# Patient Record
Sex: FEMALE | Race: WHITE | NOT HISPANIC OR LATINO | Employment: OTHER | URBAN - METROPOLITAN AREA
[De-identification: names, ages, dates, MRNs, and addresses within clinical notes are randomized per-mention and may not be internally consistent; named-entity substitution may affect disease eponyms.]

---

## 2017-07-08 ENCOUNTER — OFFICE VISIT (OUTPATIENT)
Dept: URGENT CARE | Facility: URGENT CARE | Age: 77
End: 2017-07-08
Payer: COMMERCIAL

## 2017-07-08 VITALS
HEART RATE: 84 BPM | SYSTOLIC BLOOD PRESSURE: 174 MMHG | DIASTOLIC BLOOD PRESSURE: 90 MMHG | WEIGHT: 166.6 LBS | OXYGEN SATURATION: 97 % | TEMPERATURE: 98 F | BODY MASS INDEX: 27.94 KG/M2

## 2017-07-08 DIAGNOSIS — Z48.02 ENCOUNTER FOR REMOVAL OF SUTURES: Primary | ICD-10-CM

## 2017-07-08 PROCEDURE — 99212 OFFICE O/P EST SF 10 MIN: CPT | Performed by: FAMILY MEDICINE

## 2017-07-08 NOTE — PROGRESS NOTES
Some of this note was populated by a medical assistant.      SUBJECTIVE:                                                    Marianna Calderon is a 77 year old female who presents to clinic today for the following health issues:  Suture Concern  Stitches put in June 28.      Duration: Yesterday     Description (location/character/radiation): left foot, throbbing pain, minimal focal erythema only     Intensity:  Now 4/10, when at worse 8/10 last night    Accompanying signs and symptoms: pain was different than before.    History (similar episodes/previous evaluation): None- never had stitches before    Precipitating or alleviating factors: None    Therapies tried and outcome: Aleve-effective, peroxide for suture cleaning     Problem list and histories reviewed & adjusted, as indicated.  Additional history: as documented    Patient Active Problem List   Diagnosis     Smoking     Hypercholesteremia     HYPERLIPIDEMIA LDL GOAL <130     Seborrheic keratosis     Past Surgical History:   Procedure Laterality Date     C/SECTION, CLASSICAL       HC BIOPSY OF BREAST, OPEN INCISIONAL       HYSTERECTOMY, VAGINAL       SURGICAL HISTORY OF -       bladder procedure       Social History   Substance Use Topics     Smoking status: Former Smoker     Packs/day: 0.50     Years: 55.00     Types: Cigarettes     Smokeless tobacco: Never Used      Comment: 3-4 cigarettes daily     Alcohol use No     Family History   Problem Relation Age of Onset     CANCER Mother      HEART DISEASE Father      Hypertension Daughter      Genitourinary Problems Daughter          No current outpatient prescriptions on file.     Allergies   Allergen Reactions     Lisinopril-Hydrochlorothiazide      Nausea,fatigue       Reviewed and updated as needed this visit by clinical staff       Reviewed and updated as needed this visit by Provider       ROS:  Constitutional, HEENT, cardiovascular, pulmonary, gi and gu systems are negative, except as otherwise  noted.    OBJECTIVE:     /90 (BP Location: Left arm, Patient Position: Chair, Cuff Size: Adult Regular)  Pulse 84  Temp 98  F (36.7  C) (Oral)  Wt 166 lb 9.6 oz (75.6 kg)  SpO2 97%  BMI 27.94 kg/m2  Body mass index is 27.94 kg/(m^2).  GENERAL: healthy, alert and no distress  NECK: no adenopathy, no asymmetry, masses, or scars and thyroid normal to palpation  RESP: lungs clear to auscultation - no rales, rhonchi or wheezes  CV: regular rate and rhythm, normal S1 S2, no S3 or S4, no murmur, click or rub, no peripheral edema and peripheral pulses strong  ABDOMEN: soft, nontender, no hepatosplenomegaly, no masses and bowel sounds normal  SKIN: foot medial plantar surface near great toe 7 stitches placed healing well after 10 days.     Diagnostic Test Results:  none     ASSESSMENT/PLAN:       ICD-10-CM    1. Encounter for removal of sutures Z48.02         PLAN  Sutures placed at outside hospital 10 days ago.   Area cleaned and prepared in sterile fashion. Sutures removed easily.   Bacitracin and dressing applied.   Wound cares provided.   Patient educational/instructional material provided including reasons for follow-up    The patient indicates understanding of these issues and agrees with the plan.  MD Cesar Galeano MD  [facility]

## 2017-07-08 NOTE — NURSING NOTE
"Chief Complaint   Patient presents with     Wound Check     Concern       Initial /90 (BP Location: Left arm, Patient Position: Chair, Cuff Size: Adult Regular)  Pulse 84  Temp 98  F (36.7  C) (Oral)  Wt 166 lb 9.6 oz (75.6 kg)  SpO2 97%  BMI 27.94 kg/m2 Estimated body mass index is 27.94 kg/(m^2) as calculated from the following:    Height as of 9/16/14: 5' 4.75\" (1.645 m).    Weight as of this encounter: 166 lb 9.6 oz (75.6 kg).  Medication Reconciliation: complete   Olga Stringer CMA      "

## 2017-07-08 NOTE — MR AVS SNAPSHOT
"              After Visit Summary   2017    Marianna Calderon    MRN: [id number]           Patient Information     Date Of Birth          1940        Visit Information        Provider Department      2017 9:25 AM Cesar Lopez MD [facility]         Follow-ups after your visit        Who to contact     If you have questions or need follow up information about today's clinic visit or your schedule please contact [facility] directly at [phone].  Normal or non-critical lab and imaging results will be communicated to you by MyChart, letter or phone within 4 business days after the clinic has received the results. If you do not hear from us within 7 days, please contact the clinic through Therapeutic Systemshart or phone. If you have a critical or abnormal lab result, we will notify you by phone as soon as possible.  Submit refill requests through testbirds or call your pharmacy and they will forward the refill request to us. Please allow 3 business days for your refill to be completed.          Additional Information About Your Visit        MyChart Information     testbirds lets you send messages to your doctor, view your test results, renew your prescriptions, schedule appointments and more. To sign up, go to [email or web address] . Click on \"Log in\" on the left side of the screen, which will take you to the Welcome page. Then click on \"Sign up Now\" on the right side of the page.     You will be asked to enter the access code listed below, as well as some personal information. Please follow the directions to create your username and password.     Your access code is: [email or web address]  Expires: 10/6/2017  9:59 AM     Your access code will  in 90 days. If you need help or a new code, please call your [facility] or [phone].        Care EveryWhere ID     This is your Care EveryWhere ID. This could be used by other organizations to access your [place] medical " records  [id number]        Your Vitals Were     Pulse Temperature Pulse Oximetry BMI (Body Mass Index)          84 98  F (36.7  C) (Oral) 97% 27.94 kg/m2         Blood Pressure from Last 3 Encounters:   07/08/17 174/90   09/16/14 190/70   04/17/14 178/84    Weight from Last 3 Encounters:   07/08/17 166 lb 9.6 oz (75.6 kg)   09/16/14 174 lb 8 oz (79.2 kg)   04/17/14 172 lb (78 kg)              Today, you had the following     No orders found for display       Primary Care Provider Office Phone # Fax #    Alivia Richardson [phone]       [address] [address]        Equal Access to Services     JUAN A WINSLOW : Hadii rosina ch hadasho Soomaali, waaxda luqadaha, qaybta kaalmada adeegyada, tanya velazquez . So [facility] [phone].    ATENCIÓN: Si habla español, tiene a haas disposición servicios gratuitos de asistencia lingüística. Llame al [phone].    We comply with applicable federal civil rights laws and [place] laws. We do not discriminate on the basis of race, color, national origin, age, disability sex, sexual orientation or gender identity.            Thank you!     Thank you for choosing [facility]  for your care. Our goal is always to provide you with excellent care. Hearing back from our patients is one way we can continue to improve our services. Please take a few minutes to complete the written survey that you may receive in the mail after your visit with us. Thank you!             Your Updated Medication List - Protect others around you: Learn how to safely use, store and throw away your medicines at www.disposemymeds.org.      Notice  As of 7/8/2017  9:59 AM    You have not been prescribed any medications.

## 2017-10-11 ENCOUNTER — ALLIED HEALTH/NURSE VISIT (OUTPATIENT)
Dept: NURSING | Facility: CLINIC | Age: 77
End: 2017-10-11
Payer: COMMERCIAL

## 2017-10-11 DIAGNOSIS — Z23 NEED FOR PROPHYLACTIC VACCINATION AND INOCULATION AGAINST INFLUENZA: Primary | ICD-10-CM

## 2017-10-11 PROCEDURE — 90662 IIV NO PRSV INCREASED AG IM: CPT

## 2017-10-11 PROCEDURE — 90471 IMMUNIZATION ADMIN: CPT

## 2017-10-11 NOTE — NURSING NOTE
Prior to injection verified patient identity using patient's name and date of birth.  Emily Rizo MA    Per orders of Dr. Lima, injection of flu given by Emily Rizo. Patient instructed to remain in clinic for 15 minutes afterwards, and to report any adverse reaction to me immediately.  Emily Rizo MA    VIS for Flu given on same date of administration.  Staff signature/Title: Emily Rizo MA

## 2017-10-11 NOTE — MR AVS SNAPSHOT
"              After Visit Summary   10/11/2017    Marianna Calderon    MRN: [id number]           Patient Information     Date Of Birth          1940        Visit Information        Provider Department      10/11/2017 10:55 AM CARE COORDINATOR CP [facility]        Today's Diagnoses     Need for prophylactic vaccination and inoculation against influenza    -  1       Follow-ups after your visit        Who to contact     If you have questions or need follow up information about today's clinic visit or your schedule please contact [facility] directly at [phone].  Normal or non-critical lab and imaging results will be communicated to you by ColoWraphart, letter or phone within 4 business days after the clinic has received the results. If you do not hear from us within 7 days, please contact the clinic through ColoWraphart or phone. If you have a critical or abnormal lab result, we will notify you by phone as soon as possible.  Submit refill requests through VILOOP or call your pharmacy and they will forward the refill request to us. Please allow 3 business days for your refill to be completed.          Additional Information About Your Visit        MyChart Information     VILOOP lets you send messages to your doctor, view your test results, renew your prescriptions, schedule appointments and more. To sign up, go to [email or web address] . Click on \"Log in\" on the left side of the screen, which will take you to the Welcome page. Then click on \"Sign up Now\" on the right side of the page.     You will be asked to enter the access code listed below, as well as some personal information. Please follow the directions to create your username and password.     Your access code is: [email or web address]  Expires: 2018  2:03 PM     Your access code will  in 90 days. If you need help or a new code, please call your [facility] or [phone].        Care EveryWhere ID     " This is your Care EveryWhere ID. This could be used by other organizations to access your [place] medical records  [id number]         Blood Pressure from Last 3 Encounters:   07/08/17 174/90   09/16/14 190/70   04/17/14 178/84    Weight from Last 3 Encounters:   07/08/17 166 lb 9.6 oz (75.6 kg)   09/16/14 174 lb 8 oz (79.2 kg)   04/17/14 172 lb (78 kg)              We Performed the Following     FLU VACCINE, INCREASED ANTIGEN, PRESV FREE, AGE 65+ [96269]     Vaccine Administration, Initial [34379]        Primary Care Provider Office Phone # Fax #    Alivia Richardson [phone] [address]  [facility] [address]        Equal Access to Services     [facility] : Hadii rosina ch hadasho Soomaali, waaxda luqadaha, qaybta kaalmada adeegyada, tanya velazquez . So [facility] [phone].    ATENCIÓN: Si habla español, tiene a haas disposición servicios gratuitos de asistencia lingüística. Llame al [phone].    We comply with applicable federal civil rights laws and [place] laws. We do not discriminate on the basis of race, color, national origin, age, disability, sex, sexual orientation, or gender identity.            Thank you!     Thank you for choosing [facility]  for your care. Our goal is always to provide you with excellent care. Hearing back from our patients is one way we can continue to improve our services. Please take a few minutes to complete the written survey that you may receive in the mail after your visit with us. Thank you!             Your Updated Medication List - Protect others around you: Learn how to safely use, store and throw away your medicines at www.disposemymeds.org.      Notice  As of 10/11/2017  2:03 PM    You have not been prescribed any medications.

## 2017-10-11 NOTE — PROGRESS NOTES
Injectable Influenza Immunization Documentation    1.  Is the person to be vaccinated sick today?   No    2. Does the person to be vaccinated have an allergy to a component   of the vaccine?   No    3. Has the person to be vaccinated ever had a serious reaction   to influenza vaccine in the past?   No    4. Has the person to be vaccinated ever had Guillain-Barré syndrome?   No    Form completed by Emily Rizo MA

## 2018-04-12 ENCOUNTER — OFFICE VISIT (OUTPATIENT)
Dept: FAMILY MEDICINE | Facility: CLINIC | Age: 78
End: 2018-04-12
Payer: COMMERCIAL

## 2018-04-12 VITALS
SYSTOLIC BLOOD PRESSURE: 172 MMHG | OXYGEN SATURATION: 100 % | DIASTOLIC BLOOD PRESSURE: 72 MMHG | TEMPERATURE: 97.2 F | HEART RATE: 78 BPM

## 2018-04-12 DIAGNOSIS — E78.5 HYPERLIPIDEMIA LDL GOAL <100: ICD-10-CM

## 2018-04-12 DIAGNOSIS — R07.0 THROAT PAIN: Primary | ICD-10-CM

## 2018-04-12 DIAGNOSIS — R82.90 NONSPECIFIC FINDING ON EXAMINATION OF URINE: ICD-10-CM

## 2018-04-12 DIAGNOSIS — R30.0 DYSURIA: ICD-10-CM

## 2018-04-12 DIAGNOSIS — I10 HTN, GOAL BELOW 140/90: ICD-10-CM

## 2018-04-12 LAB
ALBUMIN UR-MCNC: NEGATIVE MG/DL
ANION GAP SERPL CALCULATED.3IONS-SCNC: 9 MMOL/L (ref 3–14)
APPEARANCE UR: ABNORMAL
BACTERIA #/AREA URNS HPF: ABNORMAL /HPF
BASOPHILS # BLD AUTO: 0 10E9/L (ref 0–0.2)
BASOPHILS NFR BLD AUTO: 0.4 %
BILIRUB UR QL STRIP: NEGATIVE
BUN SERPL-MCNC: 12 MG/DL (ref 7–30)
CALCIUM SERPL-MCNC: 8.7 MG/DL (ref 8.5–10.1)
CHLORIDE SERPL-SCNC: 106 MMOL/L (ref 94–109)
CHOLEST SERPL-MCNC: 207 MG/DL
CO2 SERPL-SCNC: 24 MMOL/L (ref 20–32)
COLOR UR AUTO: YELLOW
CREAT SERPL-MCNC: 0.74 MG/DL (ref 0.52–1.04)
DEPRECATED S PYO AG THROAT QL EIA: NORMAL
DIFFERENTIAL METHOD BLD: NORMAL
EOSINOPHIL # BLD AUTO: 0.3 10E9/L (ref 0–0.7)
EOSINOPHIL NFR BLD AUTO: 4.7 %
ERYTHROCYTE [DISTWIDTH] IN BLOOD BY AUTOMATED COUNT: 14 % (ref 10–15)
GFR SERPL CREATININE-BSD FRML MDRD: 75 ML/MIN/1.7M2
GLUCOSE SERPL-MCNC: 222 MG/DL (ref 70–99)
GLUCOSE UR STRIP-MCNC: 500 MG/DL
HCT VFR BLD AUTO: 45 % (ref 35–47)
HDLC SERPL-MCNC: 48 MG/DL
HGB BLD-MCNC: 14.3 G/DL (ref 11.7–15.7)
HGB UR QL STRIP: NEGATIVE
KETONES UR STRIP-MCNC: NEGATIVE MG/DL
LDLC SERPL CALC-MCNC: 134 MG/DL
LEUKOCYTE ESTERASE UR QL STRIP: ABNORMAL
LYMPHOCYTES # BLD AUTO: 2.2 10E9/L (ref 0.8–5.3)
LYMPHOCYTES NFR BLD AUTO: 31.2 %
MCH RBC QN AUTO: 27.6 PG (ref 26.5–33)
MCHC RBC AUTO-ENTMCNC: 31.8 G/DL (ref 31.5–36.5)
MCV RBC AUTO: 87 FL (ref 78–100)
MONOCYTES # BLD AUTO: 0.5 10E9/L (ref 0–1.3)
MONOCYTES NFR BLD AUTO: 7.8 %
NEUTROPHILS # BLD AUTO: 3.9 10E9/L (ref 1.6–8.3)
NEUTROPHILS NFR BLD AUTO: 55.9 %
NITRATE UR QL: NEGATIVE
NON-SQ EPI CELLS #/AREA URNS LPF: ABNORMAL /LPF
NONHDLC SERPL-MCNC: 159 MG/DL
PH UR STRIP: 5.5 PH (ref 5–7)
PLATELET # BLD AUTO: 361 10E9/L (ref 150–450)
POTASSIUM SERPL-SCNC: 3.3 MMOL/L (ref 3.4–5.3)
RBC # BLD AUTO: 5.19 10E12/L (ref 3.8–5.2)
RBC #/AREA URNS AUTO: ABNORMAL /HPF
SODIUM SERPL-SCNC: 139 MMOL/L (ref 133–144)
SOURCE: ABNORMAL
SP GR UR STRIP: 1.01 (ref 1–1.03)
SPECIMEN SOURCE: NORMAL
TRIGL SERPL-MCNC: 124 MG/DL
TSH SERPL DL<=0.005 MIU/L-ACNC: 1.19 MU/L (ref 0.4–4)
UROBILINOGEN UR STRIP-ACNC: 0.2 EU/DL (ref 0.2–1)
WBC # BLD AUTO: 7 10E9/L (ref 4–11)
WBC #/AREA URNS AUTO: ABNORMAL /HPF

## 2018-04-12 PROCEDURE — 87081 CULTURE SCREEN ONLY: CPT | Mod: 59 | Performed by: INTERNAL MEDICINE

## 2018-04-12 PROCEDURE — 87086 URINE CULTURE/COLONY COUNT: CPT | Performed by: INTERNAL MEDICINE

## 2018-04-12 PROCEDURE — 80048 BASIC METABOLIC PNL TOTAL CA: CPT | Performed by: INTERNAL MEDICINE

## 2018-04-12 PROCEDURE — 36415 COLL VENOUS BLD VENIPUNCTURE: CPT | Performed by: INTERNAL MEDICINE

## 2018-04-12 PROCEDURE — 87880 STREP A ASSAY W/OPTIC: CPT | Performed by: INTERNAL MEDICINE

## 2018-04-12 PROCEDURE — 84443 ASSAY THYROID STIM HORMONE: CPT | Performed by: INTERNAL MEDICINE

## 2018-04-12 PROCEDURE — 99214 OFFICE O/P EST MOD 30 MIN: CPT | Performed by: INTERNAL MEDICINE

## 2018-04-12 PROCEDURE — 80061 LIPID PANEL: CPT | Performed by: INTERNAL MEDICINE

## 2018-04-12 PROCEDURE — 85025 COMPLETE CBC W/AUTO DIFF WBC: CPT | Performed by: INTERNAL MEDICINE

## 2018-04-12 PROCEDURE — 81001 URINALYSIS AUTO W/SCOPE: CPT | Performed by: INTERNAL MEDICINE

## 2018-04-12 RX ORDER — LOSARTAN POTASSIUM 25 MG/1
25 TABLET ORAL DAILY
Qty: 90 TABLET | Refills: 3 | Status: SHIPPED | OUTPATIENT
Start: 2018-04-12 | End: 2018-10-08

## 2018-04-12 RX ORDER — SULFAMETHOXAZOLE/TRIMETHOPRIM 800-160 MG
1 TABLET ORAL 2 TIMES DAILY
Qty: 14 TABLET | Refills: 0 | Status: SHIPPED | OUTPATIENT
Start: 2018-04-12 | End: 2019-07-05

## 2018-04-12 ASSESSMENT — PAIN SCALES - GENERAL: PAINLEVEL: NO PAIN (0)

## 2018-04-12 NOTE — MR AVS SNAPSHOT
"              After Visit Summary   2018    Marianna Calderon    MRN: [id number]           Patient Information     Date Of Birth          1940        Visit Information        Provider Department      2018 9:40 AM Goran Lima MD [facility]        Today's Diagnoses     Throat pain    -  1    Dysuria        Hyperlipidemia LDL goal <100        HTN, goal below 140/90        Nonspecific finding on examination of urine           Follow-ups after your visit        Who to contact     If you have questions or need follow up information about today's clinic visit or your schedule please contact [facility] directly at [phone].  Normal or non-critical lab and imaging results will be communicated to you by MyChart, letter or phone within 4 business days after the clinic has received the results. If you do not hear from us within 7 days, please contact the clinic through Live Life 360hart or phone. If you have a critical or abnormal lab result, we will notify you by phone as soon as possible.  Submit refill requests through ProviderTrust or call your pharmacy and they will forward the refill request to us. Please allow 3 business days for your refill to be completed.          Additional Information About Your Visit        MyChart Information     ProviderTrust lets you send messages to your doctor, view your test results, renew your prescriptions, schedule appointments and more. To sign up, go to [email or web address] . Click on \"Log in\" on the left side of the screen, which will take you to the Welcome page. Then click on \"Sign up Now\" on the right side of the page.     You will be asked to enter the access code listed below, as well as some personal information. Please follow the directions to create your username and password.     Your access code is: [email or web address]  Expires: 2018 10:44 AM     Your access code will  in 90 days. If you need help or a new code, " please call your [place] clinic or [phone].        Care EveryWhere ID     This is your Care EveryWhere ID. This could be used by other organizations to access your [place] medical records  [id number]        Your Vitals Were     Pulse Temperature Pulse Oximetry Breastfeeding?          78 97.2  F (36.2  C) 100% No         Blood Pressure from Last 3 Encounters:   04/12/18 172/72   07/08/17 174/90   09/16/14 190/70    Weight from Last 3 Encounters:   07/08/17 166 lb 9.6 oz (75.6 kg)   09/16/14 174 lb 8 oz (79.2 kg)   04/17/14 172 lb (78 kg)              We Performed the Following     *UA reflex to Microscopic and Culture ([place] and [facility] (except [place] and [place])     Basic metabolic panel     Beta strep group A culture     CBC with platelets and differential     Lipid panel reflex to direct LDL Non-fasting     Rapid strep screen     TSH with free T4 reflex     Urine Culture Aerobic Bacterial     Urine Microscopic          Today's Medication Changes          These changes are accurate as of 4/12/18 10:44 AM.  If you have any questions, ask your nurse or doctor.               Start taking these medicines.        Dose/Directions    losartan 25 MG tablet   Commonly known as:  COZAAR   Used for:  HTN, goal below 140/90   Started by:  Goran Lima MD        Dose:  25 mg   Take 1 tablet (25 mg) by mouth daily   Quantity:  90 tablet   Refills:  3       sulfamethoxazole-trimethoprim 800-160 MG per tablet   Commonly known as:  BACTRIM DS/SEPTRA DS   Used for:  Dysuria   Started by:  Goran Lima MD        Dose:  1 tablet   Take 1 tablet by mouth 2 times daily   Quantity:  14 tablet   Refills:  0            Where to get your medicines      These medications were sent to [facility] [place] [address]     Phone:  [phone]     losartan 25 MG tablet    sulfamethoxazole-trimethoprim 800-160 MG per tablet                Primary  Care Provider Office Phone # Fax #    Alivia Richardson [phone] [address]  [place] [address]        Equal Access to Services     JUAN A WINSLOW : Hadii aad ku hadisaaco Soomaali, waaxda luqadaha, qaybta kaalmada adeegyada, tanya diana laniltonyossi constantine. So [facility] [phone].    ATENCIÓN: Si habla español, tiene a haas disposición servicios gratuitos de asistencia lingüística. Llame al [phone].    We comply with applicable federal civil rights laws and [place] laws. We do not discriminate on the basis of race, color, national origin, age, disability, sex, sexual orientation, or gender identity.            Thank you!     Thank you for choosing [facility]  for your care. Our goal is always to provide you with excellent care. Hearing back from our patients is one way we can continue to improve our services. Please take a few minutes to complete the written survey that you may receive in the mail after your visit with us. Thank you!             Your Updated Medication List - Protect others around you: Learn how to safely use, store and throw away your medicines at www.disposemymeds.org.          This list is accurate as of 4/12/18 10:44 AM.  Always use your most recent med list.                   Brand Name Dispense Instructions for use Diagnosis    losartan 25 MG tablet    COZAAR    90 tablet    Take 1 tablet (25 mg) by mouth daily    HTN, goal below 140/90       sulfamethoxazole-trimethoprim 800-160 MG per tablet    BACTRIM DS/SEPTRA DS    14 tablet    Take 1 tablet by mouth 2 times daily    Dysuria

## 2018-04-12 NOTE — NURSING NOTE
"Chief Complaint   Patient presents with     UTI     Pharyngitis       Initial BP (!) 231/93 (BP Location: Right arm, Patient Position: Chair, Cuff Size: Adult Regular)  Pulse 78  Temp 97.2  F (36.2  C)  SpO2 100%  Breastfeeding? No Estimated body mass index is 27.94 kg/(m^2) as calculated from the following:    Height as of 9/16/14: 5' 4.75\" (1.645 m).    Weight as of 7/8/17: 166 lb 9.6 oz (75.6 kg).  Medication Reconciliation: complete   Emily Rizo MA      "

## 2018-04-12 NOTE — PROGRESS NOTES
SUBJECTIVE:   Marianna Calderon is a 77 year old female who presents to clinic today for the following health issues:      Recheck urine for possible UTI   Possible strep; grandson and daughter have strep  Patient without diabetes in the past    Blood pressure runs very high  No previous chest pain or stroke symptoms        Problem list and histories reviewed & adjusted, as indicated.  Additional history: as documented    Patient Active Problem List   Diagnosis     Smoking     Hypercholesteremia     HYPERLIPIDEMIA LDL GOAL <130     Seborrheic keratosis     Past Surgical History:   Procedure Laterality Date     C/SECTION, CLASSICAL       HC BIOPSY OF BREAST, OPEN INCISIONAL       HYSTERECTOMY, VAGINAL       SURGICAL HISTORY OF -       bladder procedure       Social History   Substance Use Topics     Smoking status: Former Smoker     Packs/day: 0.50     Years: 55.00     Types: Cigarettes     Smokeless tobacco: Never Used      Comment: 3-4 cigarettes daily     Alcohol use No     Family History   Problem Relation Age of Onset     CANCER Mother      HEART DISEASE Father      Hypertension Daughter      Genitourinary Problems Daughter          Current Outpatient Prescriptions   Medication Sig Dispense Refill     losartan (COZAAR) 25 MG tablet Take 1 tablet (25 mg) by mouth daily 90 tablet 3     sulfamethoxazole-trimethoprim (BACTRIM DS/SEPTRA DS) 800-160 MG per tablet Take 1 tablet by mouth 2 times daily 14 tablet 0     Allergies   Allergen Reactions     Lisinopril-Hydrochlorothiazide      Nausea,fatigue     Recent Labs   Lab Test  04/17/14   0926  07/30/10   0915  05/24/10   1034   LDL  154*   --   160*   HDL  48*   --   40*   TRIG  173*   --   205*   ALT   --    --   20   CR  0.74  0.85  0.88   GFRESTIMATED  77  66  63   GFRESTBLACK  >90  80  77   POTASSIUM  4.2  3.7  4.9   TSH   --    --   0.91        Reviewed and updated as needed this visit by clinical staff  Tobacco  Allergies  Meds  Med Hx  Surg Hx  Fam  Hx  Soc Hx      Reviewed and updated as needed this visit by Provider         ROS:  Constitutional, HEENT, cardiovascular, pulmonary, gi and gu systems are negative, except as otherwise noted.    OBJECTIVE:     /72  Pulse 78  Temp 97.2  F (36.2  C)  SpO2 100%  Breastfeeding? No  There is no height or weight on file to calculate BMI.  GENERAL: healthy, alert and no distress  EYES: Eyes grossly normal to inspection, PERRL and conjunctivae and sclerae normal  HENT: ear canals and TM's normal, nose and mouth without ulcers or lesions  NECK: no adenopathy, no asymmetry, masses, or scars and thyroid normal to palpation  RESP: lungs clear to auscultation - no rales, rhonchi or wheezes  CV: regular rate and rhythm, normal S1 S2, no S3 or S4, no murmur, click or rub, no peripheral edema and peripheral pulses strong  ABDOMEN: soft, nontender, no hepatosplenomegaly, no masses and bowel sounds normal  MS: no gross musculoskeletal defects noted, no edema  SKIN: no suspicious lesions or rashes  NEURO: Normal strength and tone, mentation intact and speech normal    Diagnostic Test Results:  Results for orders placed or performed in visit on 04/12/18   *UA reflex to Microscopic and Culture ([place] and [facility] (except [place] and [place])   Result Value Ref Range    Color Urine Yellow     Appearance Urine Slightly Cloudy     Glucose Urine 500 (A) NEG^Negative mg/dL    Bilirubin Urine Negative NEG^Negative    Ketones Urine Negative NEG^Negative mg/dL    Specific Gravity Urine 1.015 1.003 - 1.035    Blood Urine Negative NEG^Negative    pH Urine 5.5 5.0 - 7.0 pH    Protein Albumin Urine Negative NEG^Negative mg/dL    Urobilinogen Urine 0.2 0.2 - 1.0 EU/dL    Nitrite Urine Negative NEG^Negative    Leukocyte Esterase Urine Moderate (A) NEG^Negative    Source Midstream Urine    CBC with platelets and differential   Result Value Ref Range    WBC 7.0 4.0 - 11.0 10e9/L    RBC Count 5.19 3.8 - 5.2 10e12/L    Hemoglobin  14.3 11.7 - 15.7 g/dL    Hematocrit 45.0 35.0 - 47.0 %    MCV 87 78 - 100 fl    MCH 27.6 26.5 - 33.0 pg    MCHC 31.8 31.5 - 36.5 g/dL    RDW 14.0 10.0 - 15.0 %    Platelet Count 361 150 - 450 10e9/L    Diff Method Automated Method     % Neutrophils 55.9 %    % Lymphocytes 31.2 %    % Monocytes 7.8 %    % Eosinophils 4.7 %    % Basophils 0.4 %    Absolute Neutrophil 3.9 1.6 - 8.3 10e9/L    Absolute Lymphocytes 2.2 0.8 - 5.3 10e9/L    Absolute Monocytes 0.5 0.0 - 1.3 10e9/L    Absolute Eosinophils 0.3 0.0 - 0.7 10e9/L    Absolute Basophils 0.0 0.0 - 0.2 10e9/L   Urine Microscopic   Result Value Ref Range    WBC Urine 10-25 (A) OTO5^0 - 5 /HPF    RBC Urine 2-5 (A) OTO2^O - 2 /HPF    Squamous Epithelial /LPF Urine Few FEW^Few /LPF    Bacteria Urine Moderate (A) NEG^Negative /HPF   Rapid strep screen   Result Value Ref Range    Specimen Description Throat     Rapid Strep A Screen       NEGATIVE: No Group A streptococcal antigen detected by immunoassay, await culture report.       ASSESSMENT/PLAN:       ICD-10-CM    1. Throat pain R07.0 Rapid strep screen     Beta strep group A culture     Urine Microscopic   2. Dysuria R30.0 *UA reflex to Microscopic and Culture ([place] and [facility] (except [place] and [place])     CBC with platelets and differential     sulfamethoxazole-trimethoprim (BACTRIM DS/SEPTRA DS) 800-160 MG per tablet   3. Hyperlipidemia LDL goal <100 E78.5 Lipid panel reflex to direct LDL Non-fasting   4. HTN, goal below 140/90 I10 Basic metabolic panel     TSH with free T4 reflex     losartan (COZAAR) 25 MG tablet   5. Nonspecific finding on examination of urine R82.90 Urine Culture Aerobic Bacterial         I think it ws the HCTZ she reacted to.  If sugars are running higher, would benefit from losartan    Start at low salgado    Goran Lima MD  [facility]

## 2018-04-12 NOTE — LETTER
[facility]  [address] [place]  [address]  [phone]        April 13, 2018    Marianna Calderon  [address] [place] [address]        Dear Marianna Cabral, the blood glucose is in the early diabetic range.  Make appointment to review options for treatment.   Urine and strep are normal   Kidney function is great.   The new blood pressure medication will normalize the blood potassium level     Results for orders placed or performed in visit on 04/12/18   *UA reflex to Microscopic and Culture ([place] and [facility] (except [place] and [place])   Result Value Ref Range    Color Urine Yellow     Appearance Urine Slightly Cloudy     Glucose Urine 500 (A) NEG^Negative mg/dL    Bilirubin Urine Negative NEG^Negative    Ketones Urine Negative NEG^Negative mg/dL    Specific Gravity Urine 1.015 1.003 - 1.035    Blood Urine Negative NEG^Negative    pH Urine 5.5 5.0 - 7.0 pH    Protein Albumin Urine Negative NEG^Negative mg/dL    Urobilinogen Urine 0.2 0.2 - 1.0 EU/dL    Nitrite Urine Negative NEG^Negative    Leukocyte Esterase Urine Moderate (A) NEG^Negative    Source Midstream Urine    Basic metabolic panel   Result Value Ref Range    Sodium 139 133 - 144 mmol/L    Potassium 3.3 (L) 3.4 - 5.3 mmol/L    Chloride 106 94 - 109 mmol/L    Carbon Dioxide 24 20 - 32 mmol/L    Anion Gap 9 3 - 14 mmol/L    Glucose 222 (H) 70 - 99 mg/dL    Urea Nitrogen 12 7 - 30 mg/dL    Creatinine 0.74 0.52 - 1.04 mg/dL    GFR Estimate 75 >60 mL/min/1.7m2    GFR Estimate If Black >90 >60 mL/min/1.7m2    Calcium 8.7 8.5 - 10.1 mg/dL   CBC with platelets and differential   Result Value Ref Range    WBC 7.0 4.0 - 11.0 10e9/L    RBC Count 5.19 3.8 - 5.2 10e12/L    Hemoglobin 14.3 11.7 - 15.7 g/dL    Hematocrit 45.0 35.0 - 47.0 %    MCV 87 78 - 100 fl    MCH 27.6 26.5 - 33.0 pg    MCHC 31.8 31.5 - 36.5 g/dL    RDW 14.0 10.0 - 15.0 %    Platelet Count 361 150 - 450 10e9/L    Diff Method  Automated Method     % Neutrophils 55.9 %    % Lymphocytes 31.2 %    % Monocytes 7.8 %    % Eosinophils 4.7 %    % Basophils 0.4 %    Absolute Neutrophil 3.9 1.6 - 8.3 10e9/L    Absolute Lymphocytes 2.2 0.8 - 5.3 10e9/L    Absolute Monocytes 0.5 0.0 - 1.3 10e9/L    Absolute Eosinophils 0.3 0.0 - 0.7 10e9/L    Absolute Basophils 0.0 0.0 - 0.2 10e9/L   Lipid panel reflex to direct LDL Non-fasting   Result Value Ref Range    Cholesterol 207 (H) <200 mg/dL    Triglycerides 124 <150 mg/dL    HDL Cholesterol 48 (L) >49 mg/dL    LDL Cholesterol Calculated 134 (H) <100 mg/dL    Non HDL Cholesterol 159 (H) <130 mg/dL   TSH with free T4 reflex   Result Value Ref Range    TSH 1.19 0.40 - 4.00 mU/L   Urine Microscopic   Result Value Ref Range    WBC Urine 10-25 (A) OTO5^0 - 5 /HPF    RBC Urine 2-5 (A) OTO2^O - 2 /HPF    Squamous Epithelial /LPF Urine Few FEW^Few /LPF    Bacteria Urine Moderate (A) NEG^Negative /HPF   Rapid strep screen   Result Value Ref Range    Specimen Description Throat     Rapid Strep A Screen       NEGATIVE: No Group A streptococcal antigen detected by immunoassay, await culture report.   Beta strep group A culture   Result Value Ref Range    Specimen Description Throat     Culture Micro No beta hemolytic Streptococcus Group A isolated    Urine Culture Aerobic Bacterial   Result Value Ref Range    Specimen Description Midstream Urine     Culture Micro No growth        If you have any questions please call the clinic at [phone].    Sincerely,    Goran AMEZCUA

## 2018-04-13 LAB
BACTERIA SPEC CULT: NO GROWTH
BACTERIA SPEC CULT: NORMAL
SPECIMEN SOURCE: NORMAL
SPECIMEN SOURCE: NORMAL

## 2018-06-06 ENCOUNTER — TELEPHONE (OUTPATIENT)
Dept: FAMILY MEDICINE | Facility: CLINIC | Age: 78
End: 2018-06-06

## 2018-06-06 DIAGNOSIS — I10 HTN, GOAL BELOW 140/90: Primary | ICD-10-CM

## 2018-06-06 RX ORDER — LOSARTAN POTASSIUM 50 MG/1
50 TABLET ORAL DAILY
Qty: 90 TABLET | Refills: 3 | Status: SHIPPED | OUTPATIENT
Start: 2018-06-06 | End: 2018-10-08

## 2018-06-06 NOTE — TELEPHONE ENCOUNTER
Faxed message from pharmacy: please send new order with direction 50mg daily (losartan (COZAAR) 25 MG tablet)    Pharmacy pended.

## 2018-10-08 ENCOUNTER — ALLIED HEALTH/NURSE VISIT (OUTPATIENT)
Dept: NURSING | Facility: CLINIC | Age: 78
End: 2018-10-08
Payer: COMMERCIAL

## 2018-10-08 DIAGNOSIS — I10 HTN, GOAL BELOW 140/90: Primary | ICD-10-CM

## 2018-10-08 DIAGNOSIS — Z23 NEED FOR PROPHYLACTIC VACCINATION AND INOCULATION AGAINST INFLUENZA: Primary | ICD-10-CM

## 2018-10-08 PROCEDURE — G0008 ADMIN INFLUENZA VIRUS VAC: HCPCS

## 2018-10-08 PROCEDURE — 90662 IIV NO PRSV INCREASED AG IM: CPT

## 2018-10-08 PROCEDURE — 99207 ZZC NO CHARGE NURSE ONLY: CPT

## 2018-10-08 RX ORDER — LOSARTAN POTASSIUM 100 MG/1
100 TABLET ORAL DAILY
Qty: 90 TABLET | Refills: 3 | Status: SHIPPED | OUTPATIENT
Start: 2018-10-08 | End: 2019-11-01

## 2018-10-08 NOTE — NURSING NOTE
Due to injection administration, patient instructed to remain in clinic for 15 minutes  afterwards, and to report any adverse reaction to me immediately.  Emily Rizo MA

## 2018-10-08 NOTE — MR AVS SNAPSHOT
After Visit Summary   10/8/2018    Marianna Calderon    MRN: [id number]           Patient Information     Date Of Birth          1940        Visit Information        Provider Department      10/8/2018 3:45 PM CARE COORDINATOR [facility]        Today's Diagnoses     Need for prophylactic vaccination and inoculation against influenza    -  1       Follow-ups after your visit        Who to contact     If you have questions or need follow up information about today's clinic visit or your schedule please contact [facility] directly at [phone].  Normal or non-critical lab and imaging results will be communicated to you by MyChart, letter or phone within 4 business days after the clinic has received the results. If you do not hear from us within 7 days, please contact the clinic through MyChart or phone. If you have a critical or abnormal lab result, we will notify you by phone as soon as possible.  Submit refill requests through [employer] or call your pharmacy and they will forward the refill request to us. Please allow 3 business days for your refill to be completed.          Additional Information About Your Visit        Care EveryWhere ID     This is your Care EveryWhere ID. This could be used by other organizations to access your [place] medical records  [id number]         Blood Pressure from Last 3 Encounters:   04/12/18 172/72   07/08/17 174/90   09/16/14 190/70    Weight from Last 3 Encounters:   07/08/17 166 lb 9.6 oz (75.6 kg)   09/16/14 174 lb 8 oz (79.2 kg)   04/17/14 172 lb (78 kg)              We Performed the Following     FLU VACCINE, INCREASED ANTIGEN, PRESV FREE, AGE 65+ [24868]     Vaccine Administration, Initial [02869]          Today's Medication Changes          These changes are accurate as of 10/8/18  4:37 PM.  If you have any questions, ask your nurse or doctor.               These medicines have changed or have updated  prescriptions.        Dose/Directions    losartan 100 MG tablet   Commonly known as:  COZAAR   This may have changed:    - medication strength  - how much to take  - Another medication with the same name was removed. Continue taking this medication, and follow the directions you see here.   Used for:  HTN, goal below 140/90   Changed by:  Alivia Richardson MD        Dose:  100 mg   Take 1 tablet (100 mg) by mouth daily   Quantity:  90 tablet   Refills:  3            Where to get your medicines      Some of these will need a paper prescription and others can be bought over the counter.  Ask your nurse if you have questions.     Bring a paper prescription for each of these medications     losartan 100 MG tablet                Primary Care Provider Office Phone # Fax #    Alivia Richardson [phone] [address] [facility] [address]        Equal Access to Services     [facility]: Hadii rosina ch hadasho Soomaali, waaxda luqadaha, qaybta kaalmada adeegyada, waxfabrice velazquez . So [facility] [phone].    ATENCIÓN: Si habla español, tiene a haas disposición servicios gratuitos de asistencia lingüística. [facility] [phone].    We comply with applicable federal civil rights laws and [place] laws. We do not discriminate on the basis of race, color, national origin, age, disability, sex, sexual orientation, or gender identity.            Thank you!     Thank you for choosing [facility]  for your care. Our goal is always to provide you with excellent care. Hearing back from our patients is one way we can continue to improve our services. Please take a few minutes to complete the written survey that you may receive in the mail after your visit with us. Thank you!             Your Updated Medication List - Protect others around you: Learn how to safely use, store and throw away your medicines at www.disposemymeds.org.          This list is accurate as  of 10/8/18  4:37 PM.  Always use your most recent med list.                   Brand Name Dispense Instructions for use Diagnosis    losartan 100 MG tablet    COZAAR    90 tablet    Take 1 tablet (100 mg) by mouth daily    HTN, goal below 140/90       sulfamethoxazole-trimethoprim 800-160 MG per tablet    BACTRIM DS/SEPTRA DS    14 tablet    Take 1 tablet by mouth 2 times daily    Dysuria

## 2018-10-08 NOTE — PROGRESS NOTES

## 2019-06-05 ENCOUNTER — TELEPHONE (OUTPATIENT)
Dept: FAMILY MEDICINE | Facility: CLINIC | Age: 79
End: 2019-06-05

## 2019-06-05 NOTE — TELEPHONE ENCOUNTER
"  Panel Management Review      Patient has the following on her problem list:     Hypertension   Last three blood pressure readings:  BP Readings from Last 3 Encounters:   04/12/18 172/72   07/08/17 174/90   09/16/14 190/70     Blood pressure: FAILED    HTN Guidelines:  Less than 140/90      Composite cancer screening  Chart review shows that this patient is due/due soon for the following None  Summary:    Patient is due/failing the following:   BP CHECK and PHYSICAL    Action needed:   No action    Type of outreach:    None - patient has requested \"do not contact\" for communication preference    Questions for provider review:    None                                                                                                                                    Arline Wells,              "

## 2019-07-05 ENCOUNTER — OFFICE VISIT (OUTPATIENT)
Dept: FAMILY MEDICINE | Facility: CLINIC | Age: 79
End: 2019-07-05
Payer: COMMERCIAL

## 2019-07-05 VITALS
BODY MASS INDEX: 27.89 KG/M2 | OXYGEN SATURATION: 99 % | WEIGHT: 157.44 LBS | HEIGHT: 63 IN | TEMPERATURE: 95.7 F | DIASTOLIC BLOOD PRESSURE: 100 MMHG | SYSTOLIC BLOOD PRESSURE: 152 MMHG | HEART RATE: 77 BPM

## 2019-07-05 DIAGNOSIS — M54.31 SCIATICA OF RIGHT SIDE: Primary | ICD-10-CM

## 2019-07-05 DIAGNOSIS — I10 HYPERTENSION, UNSPECIFIED TYPE: ICD-10-CM

## 2019-07-05 PROCEDURE — 99213 OFFICE O/P EST LOW 20 MIN: CPT | Performed by: FAMILY MEDICINE

## 2019-07-05 RX ORDER — PREDNISONE 10 MG/1
20 TABLET ORAL DAILY
Qty: 10 TABLET | Refills: 0 | Status: SHIPPED | OUTPATIENT
Start: 2019-07-05 | End: 2019-07-10

## 2019-07-05 RX ORDER — TRAMADOL HYDROCHLORIDE 50 MG/1
50 TABLET ORAL EVERY 6 HOURS PRN
Qty: 14 TABLET | Refills: 0 | Status: SHIPPED | OUTPATIENT
Start: 2019-07-05 | End: 2019-07-10

## 2019-07-05 ASSESSMENT — MIFFLIN-ST. JEOR: SCORE: 1153.26

## 2019-07-05 ASSESSMENT — PAIN SCALES - GENERAL: PAINLEVEL: EXTREME PAIN (9)

## 2019-07-05 NOTE — PATIENT INSTRUCTIONS
Activity as tolerated    5 day course of prednisone    Tramadol for more severe pain    Could use occasional alleve and/ or tylenol    Follow up if symptoms not improving    Be seen promptly if symptoms acutely worsen

## 2019-07-05 NOTE — PROGRESS NOTES
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     Walking not bothersome once she gets started    No bowel or bladder problems    No history of back or leg problems    Took alleve  excedrin  Dilaudid, one pill    Nothing helped            Objective    There were no vitals taken for this visit.  There is no height or weight on file to calculate BMI.  Physical Exam        Full physical not done     Mentation and affect are fine    No tremor of speech or extremity    Patient points to right buttock/ sciatic area when asked where pain is    She is tender on palpation there    Some pain down the lateral thigh some then goes to anterior distal thigh  And knee area   Down the anterior lower leg just past penitentiary down    Good sensation and strength both legs    Equivocal straight leg raising test    No pain on int/ ext rotation of hips    Range of motion fair    ASSESSMENT / PLAN:  (M54.31) Sciatica of right side  (primary encounter diagnosis)  Comment: discussed in detail with patient.  No need for mri right now.    Plan: traMADol (ULTRAM) 50 MG tablet, predniSONE         (DELTASONE) 10 MG tablet        Follow up prn symptoms.  Trial of short prednisone course and prn tramadol; no history of seizures.  Discussed over the counter pain meds also.  Stay as active as possible.     (I10) Hypertension, unspecified type  Comment: on one med.  Blood pressure high but does have some pain.   Plan: monitor       I reviewed the patient's medications,  allergies, medical history, family history, and social history.    Erwin Rojas MD

## 2019-07-10 ENCOUNTER — TELEPHONE (OUTPATIENT)
Dept: FAMILY MEDICINE | Facility: CLINIC | Age: 79
End: 2019-07-10

## 2019-07-10 DIAGNOSIS — M54.31 SCIATICA OF RIGHT SIDE: ICD-10-CM

## 2019-07-10 RX ORDER — TRAMADOL HYDROCHLORIDE 50 MG/1
50 TABLET ORAL EVERY 6 HOURS PRN
Qty: 14 TABLET | Refills: 0 | Status: SHIPPED | OUTPATIENT
Start: 2019-07-10 | End: 2021-04-29

## 2019-07-10 RX ORDER — PREDNISONE 10 MG/1
20 TABLET ORAL DAILY
Qty: 10 TABLET | Refills: 0 | Status: SHIPPED | OUTPATIENT
Start: 2019-07-10 | End: 2021-04-29

## 2019-07-10 NOTE — TELEPHONE ENCOUNTER
See TE dated same date.  Patient was advise to be seen in clinic if medication given are not helping pain.  Mariah Vo RN

## 2019-07-10 NOTE — TELEPHONE ENCOUNTER
Patient informed of below information via detailed VM per her noted permission below.  Mariah Vo RN

## 2019-07-10 NOTE — TELEPHONE ENCOUNTER
"Patient is requesting Dr. Lima review message.  She was seen for Sciatica right sided on 7/5/2019.  Given Prednisone and Tramadol.  Since being seen, symptoms have not changed.   Patient is requesting for additional course of prednisone and Tramadol.  She states she \"has to work for the next 13 days and there is no other person to fill in at this time\".  Please advise.  Thank you.  Mariah Vo RN      "

## 2019-07-10 NOTE — TELEPHONE ENCOUNTER
Requested Prescriptions   Pending Prescriptions Disp Refills     predniSONE (DELTASONE) 10 MG tablet 10 tablet 0     Sig: Take 2 tablets (20 mg) by mouth daily       There is no refill protocol information for this order        traMADol (ULTRAM) 50 MG tablet 14 tablet 0     Sig: Take 1 tablet (50 mg) by mouth every 6 hours as needed for severe pain       There is no refill protocol information for this order        Last Written Prescription Date:  7/5/2019    Last office visit: 7/5/2019 with prescribing provider:  Deal   Future Office Visit:      Routing refill request to provider for review/approval because:  Drug not on the G refill protocol       Georgina Carrillo RN  [facility]

## 2019-07-10 NOTE — TELEPHONE ENCOUNTER
Patient was seen on 7/5/19 with Dr. Rojas.  She stated that the Tramadol and Prednisone are not working for her pain.    Janna Seo RN [facility] Triage.

## 2019-07-10 NOTE — TELEPHONE ENCOUNTER
Reason for Call:  Medication or medication refill: Medication Request  Do you use a [facility]?  Name of the pharmacy and phone number for the current request:      Name of the medication requested: traMADol (ULTRAM) 50 MG tablet ()     Other request: PT has called in and daughter called and stated she could be more assertive and needs to be filled by Dr. Lima    Can we leave a detailed message on this number? YES    Phone number patient can be reached at: Other phone number:  [phone]    Best Time: Anytime    Call taken on 7/10/2019 at 2:19 PM by Elsa Darnell

## 2019-09-18 ENCOUNTER — TELEPHONE (OUTPATIENT)
Dept: FAMILY MEDICINE | Facility: CLINIC | Age: 79
End: 2019-09-18

## 2019-09-18 NOTE — LETTER
September 18, 2019    Marianna Calderon  [address] [place] [address]    Dear Marianna    We care about your health and have reviewed your health plan. We have reviewed your medical conditions, medication list, and lab results and are making recommendations based on this review, to better manage your health.    You are in particular need of attention regarding:  - Your High Blood Pressure, Please call to schedule an appointment with your provider or nurse. You can schedule a ancillary only visit just to have the blood pressure rechecked.       Here is a list of Health Maintenance topics that are due now or due soon:  Health Maintenance Due   Topic Date Due     DEXA  1940     ZOSTER IMMUNIZATION (1 of 2) 05/24/1989     MEDICARE ANNUAL WELLNESS VISIT  05/24/2004     PNEUMOCOCCAL IMMUNIZATION 65+ LOW/MEDIUM RISK (2 of 2 - PCV13) 11/03/2009     INFLUENZA VACCINE (1) 09/01/2019       Please call us at [phone] (or use [employer]) to address the above recommendations. If we do not hear from you in the next couple of weeks we will be reaching out to you again.    Thank you for trusting [facility] and we appreciate the opportunity to serve you.  We look forward to supporting your healthcare needs in the future.    Healthy Regards,    Your care team

## 2019-09-18 NOTE — TELEPHONE ENCOUNTER
Panel Management Review      Patient has the following on her problem list:     Hypertension   Last three blood pressure readings:  BP Readings from Last 3 Encounters:   07/05/19 (!) 152/100   04/12/18 172/72   07/08/17 174/90     Blood pressure: FAILED    HTN Guidelines:  Less than 140/90      Composite cancer screening  Chart review shows that this patient is due/due soon for the following None  Summary:    Patient is due/failing the following:   BP CHECK    Action needed:   Patient needs ancillary only visit for blood pressure recheck    Type of outreach:    Sent letter. 09/18/2019    Questions for provider review:    None                                                                                                                                    [email or web address]       Chart routed to Care Team .

## 2019-10-14 NOTE — TELEPHONE ENCOUNTER
Panel Management Review  Summary:    Type of outreach:    Phone, left message for patient to call back.  10/14/2019    Encounter routed to Care Team.                                                                                                                               Brianda Villafuerte CMA

## 2019-11-01 DIAGNOSIS — I10 HTN, GOAL BELOW 140/90: ICD-10-CM

## 2019-11-01 RX ORDER — LOSARTAN POTASSIUM 100 MG/1
100 TABLET ORAL DAILY
Qty: 90 TABLET | Refills: 3 | Status: SHIPPED | OUTPATIENT
Start: 2019-11-01 | End: 2021-01-21

## 2019-11-01 NOTE — TELEPHONE ENCOUNTER
Panel Management Review  Summary:    Type of outreach:    Phone, left message for patient to call back.     Encounter routed to No Action Needed.                                                                                                                               Brianda Villafuerte CMA

## 2020-01-02 ENCOUNTER — TELEPHONE (OUTPATIENT)
Dept: FAMILY MEDICINE | Facility: CLINIC | Age: 80
End: 2020-01-02

## 2020-01-02 NOTE — LETTER
January 29, 2020    Marianna Calderon  [address] [place] [address]      Dear Marianna Calderon,     We have tried to contact you about your health, but have been unable to reach you.  Please call us as soon as possible so we can provide you with the best care possible.  We will continue to check in with you throughout the year to complete these items of care, if you are not able to complete these items at this time.  If you would like to complete the missing items for your care, please contact us at [phone].    We recommend the following:  -schedule a FOLLOWUP OFFICE APPOINTMENT with your provider.           Sincerely,     Your Care Team at [facility] [place]

## 2020-01-02 NOTE — LETTER
January 2, 2020    Marianna Calderon  [address] [place] [address]    Dear Marianna    We care about your health and have reviewed your health plan. We have reviewed your medical conditions, medication list, and lab results and are making recommendations based on this review, to better manage your health.    You are in particular need of attention regarding:  - Your High Blood Pressure, Please call to schedule an appointment with your provider or nurse      Here is a list of Health Maintenance topics that are due now or due soon:  Health Maintenance Due   Topic Date Due     DEXA  1940     ZOSTER IMMUNIZATION (1 of 2) 05/24/1989     MEDICARE ANNUAL WELLNESS VISIT  05/24/2004     PNEUMOCOCCAL IMMUNIZATION 65+ LOW/MEDIUM RISK (2 of 2 - PCV13) 11/03/2009     PHQ-2  01/01/2020       Please call us at [phone] (or use [employer]) to address the above recommendations. If we do not hear from you in the next couple of weeks we will be reaching out to you again.    Thank you for trusting [facility] and we appreciate the opportunity to serve you.  We look forward to supporting your healthcare needs in the future.    Healthy Regards,    Your care team

## 2020-01-02 NOTE — TELEPHONE ENCOUNTER
Panel Management Review      Patient has the following on her problem list:     Hypertension   Last three blood pressure readings:  BP Readings from Last 3 Encounters:   07/05/19 (!) 152/100   04/12/18 172/72   07/08/17 174/90     Blood pressure: FAILED    HTN Guidelines:  Less than 140/90      Composite cancer screening  Chart review shows that this patient is due/due soon for the following None  Summary:    Patient is due/failing the following:   BP CHECK    Action needed:   Patient needs nurse only appointment.    Type of outreach:    Sent letter. 01/02/2020    Questions for provider review:    None                                                                                                                                    Brianda Villafuerte [facility]       Chart routed to Care Team .

## 2020-01-21 NOTE — TELEPHONE ENCOUNTER
Panel Management Review  Summary:    Type of outreach:    Phone, left message for patient to call back.  01/21/2020    Encounter routed to Care Team.                                                                                                                               Brianda Villafuerte CMA

## 2020-01-29 NOTE — TELEPHONE ENCOUNTER
Panel Management Review  Summary:    Type of outreach:    Sent letter. Final letter    Encounter routed to No Action Needed.                                                                                                                               Brianda Villafuerte CMA

## 2021-01-21 DIAGNOSIS — I10 HTN, GOAL BELOW 140/90: ICD-10-CM

## 2021-01-21 RX ORDER — LOSARTAN POTASSIUM 100 MG/1
100 TABLET ORAL DAILY
Qty: 90 TABLET | Refills: 3 | Status: SHIPPED | OUTPATIENT
Start: 2021-01-21 | End: 2024-07-30

## 2021-03-16 ENCOUNTER — IMMUNIZATION (OUTPATIENT)
Dept: PEDIATRICS | Facility: CLINIC | Age: 81
End: 2021-03-16
Payer: COMMERCIAL

## 2021-03-16 PROCEDURE — 0001A PR COVID VAC PFIZER DIL RECON 30 MCG/0.3 ML IM: CPT

## 2021-03-16 PROCEDURE — 91300 PR COVID VAC PFIZER DIL RECON 30 MCG/0.3 ML IM: CPT

## 2021-04-06 ENCOUNTER — IMMUNIZATION (OUTPATIENT)
Dept: PEDIATRICS | Facility: CLINIC | Age: 81
End: 2021-04-06
Attending: INTERNAL MEDICINE
Payer: COMMERCIAL

## 2021-04-06 PROCEDURE — 91300 PR COVID VAC PFIZER DIL RECON 30 MCG/0.3 ML IM: CPT

## 2021-04-06 PROCEDURE — 0002A PR COVID VAC PFIZER DIL RECON 30 MCG/0.3 ML IM: CPT

## 2021-04-29 ENCOUNTER — OFFICE VISIT (OUTPATIENT)
Dept: FAMILY MEDICINE | Facility: CLINIC | Age: 81
End: 2021-04-29
Payer: COMMERCIAL

## 2021-04-29 VITALS
SYSTOLIC BLOOD PRESSURE: 160 MMHG | DIASTOLIC BLOOD PRESSURE: 84 MMHG | WEIGHT: 189 LBS | OXYGEN SATURATION: 99 % | HEART RATE: 90 BPM | BODY MASS INDEX: 33.48 KG/M2

## 2021-04-29 DIAGNOSIS — F43.25 ADJUSTMENT DISORDER WITH MIXED DISTURBANCE OF EMOTIONS AND CONDUCT: ICD-10-CM

## 2021-04-29 DIAGNOSIS — E78.5 HYPERLIPIDEMIA LDL GOAL <130: Primary | ICD-10-CM

## 2021-04-29 DIAGNOSIS — I10 ESSENTIAL HYPERTENSION: ICD-10-CM

## 2021-04-29 LAB
ANION GAP SERPL CALCULATED.3IONS-SCNC: 2 MMOL/L (ref 3–14)
BASOPHILS # BLD AUTO: 0.1 10E9/L (ref 0–0.2)
BASOPHILS NFR BLD AUTO: 1 %
BUN SERPL-MCNC: 27 MG/DL (ref 7–30)
CALCIUM SERPL-MCNC: 8.4 MG/DL (ref 8.5–10.1)
CHLORIDE SERPL-SCNC: 112 MMOL/L (ref 94–109)
CHOLEST SERPL-MCNC: 221 MG/DL
CO2 SERPL-SCNC: 28 MMOL/L (ref 20–32)
CREAT SERPL-MCNC: 0.93 MG/DL (ref 0.52–1.04)
DIFFERENTIAL METHOD BLD: ABNORMAL
EOSINOPHIL # BLD AUTO: 0.4 10E9/L (ref 0–0.7)
EOSINOPHIL NFR BLD AUTO: 5 %
ERYTHROCYTE [DISTWIDTH] IN BLOOD BY AUTOMATED COUNT: 14.3 % (ref 10–15)
GFR SERPL CREATININE-BSD FRML MDRD: 58 ML/MIN/{1.73_M2}
GLUCOSE SERPL-MCNC: 124 MG/DL (ref 70–99)
HCT VFR BLD AUTO: 43.2 % (ref 35–47)
HDLC SERPL-MCNC: 40 MG/DL
HGB BLD-MCNC: 13.5 G/DL (ref 11.7–15.7)
LDLC SERPL CALC-MCNC: 149 MG/DL
LYMPHOCYTES # BLD AUTO: 2.3 10E9/L (ref 0.8–5.3)
LYMPHOCYTES NFR BLD AUTO: 27 %
MCH RBC QN AUTO: 27.3 PG (ref 26.5–33)
MCHC RBC AUTO-ENTMCNC: 31.3 G/DL (ref 31.5–36.5)
MCV RBC AUTO: 87 FL (ref 78–100)
MONOCYTES # BLD AUTO: 1 10E9/L (ref 0–1.3)
MONOCYTES NFR BLD AUTO: 11 %
NEUTROPHILS # BLD AUTO: 4.9 10E9/L (ref 1.6–8.3)
NEUTROPHILS NFR BLD AUTO: 56 %
NONHDLC SERPL-MCNC: 181 MG/DL
PLATELET # BLD AUTO: 353 10E9/L (ref 150–450)
PLATELET # BLD EST: ABNORMAL 10*3/UL
POTASSIUM SERPL-SCNC: 4.5 MMOL/L (ref 3.4–5.3)
RBC # BLD AUTO: 4.94 10E12/L (ref 3.8–5.2)
SODIUM SERPL-SCNC: 142 MMOL/L (ref 133–144)
TRIGL SERPL-MCNC: 162 MG/DL
TSH SERPL DL<=0.005 MIU/L-ACNC: 1.37 MU/L (ref 0.4–4)
VARIANT LYMPHS BLD QL SMEAR: PRESENT
WBC # BLD AUTO: 8.7 10E9/L (ref 4–11)

## 2021-04-29 PROCEDURE — 36415 COLL VENOUS BLD VENIPUNCTURE: CPT | Performed by: INTERNAL MEDICINE

## 2021-04-29 PROCEDURE — 84443 ASSAY THYROID STIM HORMONE: CPT | Performed by: INTERNAL MEDICINE

## 2021-04-29 PROCEDURE — 80061 LIPID PANEL: CPT | Performed by: INTERNAL MEDICINE

## 2021-04-29 PROCEDURE — 99214 OFFICE O/P EST MOD 30 MIN: CPT | Performed by: INTERNAL MEDICINE

## 2021-04-29 PROCEDURE — 85025 COMPLETE CBC W/AUTO DIFF WBC: CPT | Performed by: INTERNAL MEDICINE

## 2021-04-29 PROCEDURE — 80048 BASIC METABOLIC PNL TOTAL CA: CPT | Performed by: INTERNAL MEDICINE

## 2021-04-29 RX ORDER — LOSARTAN POTASSIUM AND HYDROCHLOROTHIAZIDE 12.5; 1 MG/1; MG/1
1 TABLET ORAL DAILY
Qty: 31 TABLET | Refills: 11 | Status: SHIPPED | OUTPATIENT
Start: 2021-04-29 | End: 2022-05-14

## 2021-04-29 NOTE — PROGRESS NOTES
Assessment & Plan   Problem List Items Addressed This Visit     HYPERLIPIDEMIA LDL GOAL <130 - Primary    Relevant Orders    REVIEW OF HEALTH MAINTENANCE PROTOCOL ORDERS (Completed)    Lipid panel reflex to direct LDL Non-fasting (Completed)    TSH with free T4 reflex (Completed)      Other Visit Diagnoses     Essential hypertension        Relevant Medications    losartan-hydrochlorothiazide (HYZAAR) 100-12.5 MG tablet    Other Relevant Orders    REVIEW OF HEALTH MAINTENANCE PROTOCOL ORDERS (Completed)    Basic metabolic panel  (Ca, Cl, CO2, Creat, Gluc, K, Na, BUN) (Completed)    CBC with platelets and differential (Completed)    Adjustment disorder with mixed disturbance of emotions and conduct           1 year since son's death and feeling increase in grief symptoms   Offered counseling or medication  Patient wants to rely on the things that have helped in the past      [id number]     Regular exercise    No follow-ups on file.    Goran Lima MD  [facility] SOL Cabral is a 80 year old who presents for the following health issues     HPI   Losartan   Hypertension Follow-up      Do you check your blood pressure regularly outside of the clinic? No     Are you following a low salt diet? No    Are your blood pressures ever more than 140 on the top number (systolic) OR more   than 90 on the bottom number (diastolic), for example 140/90? Yes      How many servings of fruits and vegetables do you eat daily?  0-1    On average, how many sweetened beverages do you drink each day (Examples: soda, juice, sweet tea, etc.  Do NOT count diet or artificially sweetened beverages)?   0    How many days per week do you exercise enough to make your heart beat faster?     How many minutes a day do you exercise enough to make your heart beat faster?     How many days per week do you miss taking your medication? 0        Review of Systems   Constitutional, HEENT, cardiovascular, pulmonary, gi and gu  systems are negative, except as otherwise noted.      Objective    BP (!) 160/84   Pulse 90   Wt 85.7 kg (189 lb)   SpO2 99%   BMI 33.48 kg/m    Body mass index is 33.48 kg/m .  Physical Exam   GENERAL: healthy, alert and no distress  EYES: Eyes grossly normal to inspection, PERRL and conjunctivae and sclerae normal  HENT: ear canals and TM's normal, nose and mouth without ulcers or lesions  NECK: no adenopathy, no asymmetry, masses, or scars and thyroid normal to palpation  RESP: lungs clear to auscultation - no rales, rhonchi or wheezes  CV: regular rate and rhythm, normal S1 S2, no S3 or S4, no murmur, click or rub, no peripheral edema and peripheral pulses strong  ABDOMEN: soft, nontender, no hepatosplenomegaly, no masses and bowel sounds normal  MS: no gross musculoskeletal defects noted, no edema  SKIN: no suspicious lesions or rashes  NEURO: Normal strength and tone, mentation intact and speech normal  BACK: no CVA tenderness, no paralumbar tenderness  PSYCH: mentation appears normal, affect normal/bright  LYMPH: no cervical, supraclavicular, axillary, or inguinal adenopathy    Results for orders placed or performed in visit on 04/29/21   Basic metabolic panel  (Ca, Cl, CO2, Creat, Gluc, K, Na, BUN)     Status: Abnormal   Result Value Ref Range    Sodium 142 133 - 144 mmol/L    Potassium 4.5 3.4 - 5.3 mmol/L    Chloride 112 (H) 94 - 109 mmol/L    Carbon Dioxide 28 20 - 32 mmol/L    Anion Gap 2 (L) 3 - 14 mmol/L    Glucose 124 (H) 70 - 99 mg/dL    Urea Nitrogen 27 7 - 30 mg/dL    Creatinine 0.93 0.52 - 1.04 mg/dL    GFR Estimate 58 (L) >60 mL/min/[1.73_m2]    GFR Estimate If Black 67 >60 mL/min/[1.73_m2]    Calcium 8.4 (L) 8.5 - 10.1 mg/dL   Lipid panel reflex to direct LDL Non-fasting     Status: Abnormal   Result Value Ref Range    Cholesterol 221 (H) <200 mg/dL    Triglycerides 162 (H) <150 mg/dL    HDL Cholesterol 40 (L) >49 mg/dL    LDL Cholesterol Calculated 149 (H) <100 mg/dL    Non HDL Cholesterol  181 (H) <130 mg/dL   CBC with platelets and differential     Status: Abnormal   Result Value Ref Range    WBC 8.7 4.0 - 11.0 10e9/L    RBC Count 4.94 3.8 - 5.2 10e12/L    Hemoglobin 13.5 11.7 - 15.7 g/dL    Hematocrit 43.2 35.0 - 47.0 %    MCV 87 78 - 100 fl    MCH 27.3 26.5 - 33.0 pg    MCHC 31.3 (L) 31.5 - 36.5 g/dL    RDW 14.3 10.0 - 15.0 %    Platelet Count 353 150 - 450 10e9/L    % Neutrophils 56.0 %    % Lymphocytes 27.0 %    % Monocytes 11.0 %    % Eosinophils 5.0 %    % Basophils 1.0 %    Absolute Neutrophil 4.9 1.6 - 8.3 10e9/L    Absolute Lymphocytes 2.3 0.8 - 5.3 10e9/L    Absolute Monocytes 1.0 0.0 - 1.3 10e9/L    Absolute Eosinophils 0.4 0.0 - 0.7 10e9/L    Absolute Basophils 0.1 0.0 - 0.2 10e9/L    Reactive Lymphs Present     Platelet Estimate       Automated count confirmed.  Platelet morphology is normal.    Diff Method Automated Method    TSH with free T4 reflex     Status: None   Result Value Ref Range    TSH 1.37 0.40 - 4.00 mU/L

## 2021-04-29 NOTE — LETTER
May 10, 2021      Marianna Calderon  [address] [place] [id number]        Dear ,    We are writing to inform you of your test results.    Overall, the labs look good.  Cholesterol levels are elevated and would benefit from a low dose of lipitor if you are interested       Resulted Orders   Basic metabolic panel  (Ca, Cl, CO2, Creat, Gluc, K, Na, BUN)   Result Value Ref Range    Sodium 142 133 - 144 mmol/L    Potassium 4.5 3.4 - 5.3 mmol/L    Chloride 112 (H) 94 - 109 mmol/L    Carbon Dioxide 28 20 - 32 mmol/L    Anion Gap 2 (L) 3 - 14 mmol/L    Glucose 124 (H) 70 - 99 mg/dL      Comment:      Non Fasting    Urea Nitrogen 27 7 - 30 mg/dL    Creatinine 0.93 0.52 - 1.04 mg/dL    GFR Estimate 58 (L) >60 mL/min/[1.73_m2]      Comment:      Non  GFR Calc  Starting 12/18/2018, serum creatinine based estimated GFR (eGFR) will be   calculated using the Chronic Kidney Disease Epidemiology Collaboration   (CKD-EPI) equation.      GFR Estimate If Black 67 >60 mL/min/[1.73_m2]      Comment:       GFR Calc  Starting 12/18/2018, serum creatinine based estimated GFR (eGFR) will be   calculated using the Chronic Kidney Disease Epidemiology Collaboration   (CKD-EPI) equation.      Calcium 8.4 (L) 8.5 - 10.1 mg/dL   Lipid panel reflex to direct LDL Non-fasting   Result Value Ref Range    Cholesterol 221 (H) <200 mg/dL      Comment:      Desirable:       <200 mg/dl    Triglycerides 162 (H) <150 mg/dL      Comment:      Borderline high:  150-199 mg/dl  High:             200-499 mg/dl  Very high:       >499 mg/dl  Non Fasting      HDL Cholesterol 40 (L) >49 mg/dL    LDL Cholesterol Calculated 149 (H) <100 mg/dL      Comment:      Above desirable:  100-129 mg/dl  Borderline High:  130-159 mg/dL  High:             160-189 mg/dL  Very high:       >189 mg/dl      Non HDL Cholesterol 181 (H) <130 mg/dL      Comment:      Above Desirable:  130-159 mg/dl  Borderline high:  160-189  mg/dl  High:             190-219 mg/dl  Very high:       >219 mg/dl     CBC with platelets and differential   Result Value Ref Range    WBC 8.7 4.0 - 11.0 10e9/L    RBC Count 4.94 3.8 - 5.2 10e12/L    Hemoglobin 13.5 11.7 - 15.7 g/dL    Hematocrit 43.2 35.0 - 47.0 %    MCV 87 78 - 100 fl    MCH 27.3 26.5 - 33.0 pg    MCHC 31.3 (L) 31.5 - 36.5 g/dL      Comment:      REPEATED    RDW 14.3 10.0 - 15.0 %    Platelet Count 353 150 - 450 10e9/L    % Neutrophils 56.0 %    % Lymphocytes 27.0 %    % Monocytes 11.0 %    % Eosinophils 5.0 %    % Basophils 1.0 %    Absolute Neutrophil 4.9 1.6 - 8.3 10e9/L    Absolute Lymphocytes 2.3 0.8 - 5.3 10e9/L    Absolute Monocytes 1.0 0.0 - 1.3 10e9/L    Absolute Eosinophils 0.4 0.0 - 0.7 10e9/L    Absolute Basophils 0.1 0.0 - 0.2 10e9/L    Reactive Lymphs Present     Platelet Estimate       Automated count confirmed.  Platelet morphology is normal.    Diff Method Automated Method    TSH with free T4 reflex   Result Value Ref Range    TSH 1.37 0.40 - 4.00 mU/L       If you have any questions or concerns, please call the clinic at the number listed above.       Sincerely,      Goran Lima MD/eric

## 2021-09-16 ENCOUNTER — IMMUNIZATION (OUTPATIENT)
Dept: FAMILY MEDICINE | Facility: CLINIC | Age: 81
End: 2021-09-16
Payer: COMMERCIAL

## 2021-09-16 DIAGNOSIS — Z23 NEED FOR PROPHYLACTIC VACCINATION AND INOCULATION AGAINST INFLUENZA: Primary | ICD-10-CM

## 2021-09-16 PROCEDURE — 90662 IIV NO PRSV INCREASED AG IM: CPT

## 2021-09-16 PROCEDURE — G0008 ADMIN INFLUENZA VIRUS VAC: HCPCS

## 2021-11-08 ENCOUNTER — OFFICE VISIT (OUTPATIENT)
Dept: ORTHOPEDICS | Facility: CLINIC | Age: 81
End: 2021-11-08
Payer: COMMERCIAL

## 2021-11-08 VITALS — HEART RATE: 88 BPM | RESPIRATION RATE: 16 BRPM

## 2021-11-08 DIAGNOSIS — S82.832A OTHER CLOSED FRACTURE OF DISTAL END OF LEFT FIBULA, INITIAL ENCOUNTER: Primary | ICD-10-CM

## 2021-11-08 PROCEDURE — 99203 OFFICE O/P NEW LOW 30 MIN: CPT | Performed by: PEDIATRICS

## 2021-11-08 RX ORDER — OXYCODONE HYDROCHLORIDE 5 MG/1
5 TABLET ORAL
COMMUNITY
Start: 2021-11-06 | End: 2022-04-01

## 2021-11-08 NOTE — PROGRESS NOTES
ASSESSMENT & PLAN    Marianna was seen today for injury.    Diagnoses and all orders for this visit:    Other closed fracture of distal end of left fibula, initial encounter  -     Rolling Knee Walker Order for DME - ONLY FOR DME  -     Ankle/Foot Bracing Supplies Order for DME - ONLY FOR DME      Discussed nature of fracture, fracture healing. Will monitor radiographically for stability.  Support options reviewed. Settled on cam walker, though may convert to cast if needed.  NWB advised; knee scooter DME order placed.  Follow-up 1 week with repeat x-ray.  Questions answered. Discussed signs and symptoms that may indicate more serious issues; the patient was instructed to seek appropriate care if noted. Marianna indicates understanding of these issues and agrees with the plan.        See Patient Instructions  Patient Instructions   Reviewed x-rays, demonstrating some fibula fracture.  Icing, elevation for soreness.  Discussed avoiding weightbearing as much as possible.  Reviewed support options for the fracture, including with CAM Walker, splinting, casting.  Plan use of Cam walker, with routine use, may remove for hygiene.  DME order placed for a knee scooter.  Plan recheck approximately 1 week, with repeat nonweightbearing x-rays of the ankle to include this area, sooner if needed.    If you have any further questions for your physician or physician s care team you can call [phone] and use option 3 to leave a voice message. Calls received during business hours will be returned same day.        Jean Mcknight [facility]    -----  Chief Complaint   Patient presents with     Left Ankle - Injury       SUBJECTIVE  Marianna Calderon is a/an 81 year old female who is seen as an ER referral for evaluation of a left ankle injury.  Slipped on some leaves and possible inversion injury where she heard a pop.  Was seen in the ER, x rays were taken and she was placed in a  posterior mold.    She has been crawling to get around.  Here today in a wheechair.    Denies any knee pain.  .     The patient is seen with their daughter.  The patient is Right handed    Onset:11/6/21, 2  day(s) ago. Patient describes injury as slipped and fell while carrying a box.    Location of Pain: left ankle   Worsened by: pressure on ankle, walking, weight bearing   Better with: rest   Treatments tried: rest/activity avoidance and casting/splinting/bracing  Associated symptoms: swelling    Orthopedic/Surgical history: HX of ankle fracture of her ankle in 1964  Social History/Occupation: retired     No family history pertinent to patient's problem today.     **      REVIEW OF SYSTEMS:  Review of Systems   All other systems reviewed and are negative.        OBJECTIVE:  Pulse 88   Resp 16    General: healthy, alert and in no distress  HEENT: no scleral icterus or conjunctival erythema  Skin: no suspicious lesions or rash. No jaundice.  CV: distal perfusion intact   Resp: normal respiratory effort without conversational dyspnea   Psych: normal mood and affect  Gait: wheelchair  Neuro: Normal light sensory exam of  extremity     Left Ankle Exam:    Inspection:       no visible ecchymosis       Normal DP artery pulse       Normal PT artery pulse       Mild-moderate diffuse swelling distal lower leg, ankle    Foot inspection:       no deformity noted    ROM:        limited dorsiflexion        limited plantarflexion        limited inversion        limited eversion   Toe motion grossly intact    Strength:  deferred    Tender:  Lateral ankle, distal fibula    Nontender proximal lower leg    Skin:       well perfused       capillary refill brisk    Sensation:  Grossly intact to light touch        RADIOLOGY:  I independently visualized and reviewed these images with the patient  Mildly displaced oblique distal fibula fracture. Mortise appears intact.      EXAM: XR ANKLE 3 VIEWS LEFT   LOCATION: [facility]    DATE/TIME: 11/06/2021, 12:02 PM     INDICATION: Fall. Problem/pain.   COMPARISON: None.     IMPRESSION: Mildly comminuted oblique fracture of the fibular distal diaphysis. There is slight angulation and 2.5 mm of displacement.      Review of prior external note(s) from - ED  Review of the result(s) of each unique test - imaging  Independent interpretation of a test performed by another physician/other qualified health care professional (not separately reported) - imaging

## 2021-11-08 NOTE — PATIENT INSTRUCTIONS
Reviewed x-rays, demonstrating some fibula fracture.  Icing, elevation for soreness.  Discussed avoiding weightbearing as much as possible.  Reviewed support options for the fracture, including with CAM Walker, splinting, casting.  Plan use of Cam walker, with routine use, may remove for hygiene.  DME order placed for a knee scooter.  Plan recheck approximately 1 week, with repeat nonweightbearing x-rays of the ankle to include this area, sooner if needed.    If you have any further questions for your physician or physician s care team you can call [phone] and use option 3 to leave a voice message. Calls received during business hours will be returned same day.

## 2021-11-08 NOTE — LETTER
11/8/2021         RE: Marianna Calderon  [address] [place] [address]        Dear Colleague,    Thank you for referring your patient, Marianna Calderon, to the [facility]. Please see a copy of my visit note below.    ASSESSMENT & PLAN    Marianna was seen today for injury.    Diagnoses and all orders for this visit:    Other closed fracture of distal end of left fibula, initial encounter  -     Rolling Knee Walker Order for DME - ONLY FOR DME  -     Ankle/Foot Bracing Supplies Order for DME - ONLY FOR DME      Discussed nature of fracture, fracture healing. Will monitor radiographically for stability.  Support options reviewed. Settled on cam walker, though may convert to cast if needed.  NWB advised; knee scooter DME order placed.  Follow-up 1 week with repeat x-ray.  Questions answered. Discussed signs and symptoms that may indicate more serious issues; the patient was instructed to seek appropriate care if noted. Marianna indicates understanding of these issues and agrees with the plan.        See Patient Instructions  Patient Instructions   Reviewed x-rays, demonstrating some fibula fracture.  Icing, elevation for soreness.  Discussed avoiding weightbearing as much as possible.  Reviewed support options for the fracture, including with CAM Walker, splinting, casting.  Plan use of Cam walker, with routine use, may remove for hygiene.  DME order placed for a knee scooter.  Plan recheck approximately 1 week, with repeat nonweightbearing x-rays of the ankle to include this area, sooner if needed.    If you have any further questions for your physician or physician s care team you can call [phone] and use option 3 to leave a voice message. Calls received during business hours will be returned same day.        Jean Mcknight DO  [facility]    -----  Chief Complaint   Patient presents with     Left Ankle - Injury        SUBJECTIVE  Marianna Calderon is a/an 81 year old female who is seen as an ER referral for evaluation of a left ankle injury.  Slipped on some leaves and possible inversion injury where she heard a pop.  Was seen in the ER, x rays were taken and she was placed in a posterior mold.    She has been crawling to get around.  Here today in a wheechair.    Denies any knee pain.  .     The patient is seen with their daughter.  The patient is Right handed    Onset:11/6/21, 2  day(s) ago. Patient describes injury as slipped and fell while carrying a box.    Location of Pain: left ankle   Worsened by: pressure on ankle, walking, weight bearing   Better with: rest   Treatments tried: rest/activity avoidance and casting/splinting/bracing  Associated symptoms: swelling    Orthopedic/Surgical history: HX of ankle fracture of her ankle in 1964  Social History/Occupation: retired     No family history pertinent to patient's problem today.     **      REVIEW OF SYSTEMS:  Review of Systems   All other systems reviewed and are negative.        OBJECTIVE:  Pulse 88   Resp 16    General: healthy, alert and in no distress  HEENT: no scleral icterus or conjunctival erythema  Skin: no suspicious lesions or rash. No jaundice.  CV: distal perfusion intact   Resp: normal respiratory effort without conversational dyspnea   Psych: normal mood and affect  Gait: wheelchair  Neuro: Normal light sensory exam of  extremity     Left Ankle Exam:    Inspection:       no visible ecchymosis       Normal DP artery pulse       Normal PT artery pulse       Mild-moderate diffuse swelling distal lower leg, ankle    Foot inspection:       no deformity noted    ROM:        limited dorsiflexion        limited plantarflexion        limited inversion        limited eversion   Toe motion grossly intact    Strength:  deferred    Tender:  Lateral ankle, distal fibula    Nontender proximal lower leg    Skin:       well perfused       capillary refill  brisk    Sensation:  Grossly intact to light touch        RADIOLOGY:  I independently visualized and reviewed these images with the patient  Mildly displaced oblique distal fibula fracture. Mortise appears intact.      EXAM: XR ANKLE 3 VIEWS LEFT   LOCATION: [facility]   DATE/TIME: 11/06/2021, 12:02 PM     INDICATION: Fall. Problem/pain.   COMPARISON: None.     IMPRESSION: Mildly comminuted oblique fracture of the fibular distal diaphysis. There is slight angulation and 2.5 mm of displacement.      Review of prior external note(s) from - ED  Review of the result(s) of each unique test - imaging  Independent interpretation of a test performed by another physician/other qualified health care professional (not separately reported) - imaging             Again, thank you for allowing me to participate in the care of your patient.        Sincerely,        Jean Mcknight DO

## 2021-11-09 DIAGNOSIS — M62.838 MUSCLE SPASM: Primary | ICD-10-CM

## 2021-11-09 RX ORDER — TIZANIDINE 2 MG/1
2 TABLET ORAL 3 TIMES DAILY PRN
Qty: 60 TABLET | Refills: 1 | Status: SHIPPED | OUTPATIENT
Start: 2021-11-09 | End: 2022-07-11

## 2021-11-15 ENCOUNTER — TELEPHONE (OUTPATIENT)
Dept: ORTHOPEDICS | Facility: CLINIC | Age: 81
End: 2021-11-15

## 2021-11-15 ENCOUNTER — OFFICE VISIT (OUTPATIENT)
Dept: ORTHOPEDICS | Facility: CLINIC | Age: 81
End: 2021-11-15
Payer: COMMERCIAL

## 2021-11-15 ENCOUNTER — ANCILLARY PROCEDURE (OUTPATIENT)
Dept: CT IMAGING | Facility: CLINIC | Age: 81
End: 2021-11-15
Attending: PEDIATRICS
Payer: COMMERCIAL

## 2021-11-15 ENCOUNTER — ANCILLARY PROCEDURE (OUTPATIENT)
Dept: GENERAL RADIOLOGY | Facility: CLINIC | Age: 81
End: 2021-11-15
Attending: PEDIATRICS
Payer: COMMERCIAL

## 2021-11-15 VITALS — HEART RATE: 82 BPM | RESPIRATION RATE: 18 BRPM

## 2021-11-15 DIAGNOSIS — S82.832D OTHER CLOSED FRACTURE OF DISTAL END OF LEFT FIBULA WITH ROUTINE HEALING, SUBSEQUENT ENCOUNTER: ICD-10-CM

## 2021-11-15 DIAGNOSIS — S82.832D OTHER CLOSED FRACTURE OF DISTAL END OF LEFT FIBULA WITH ROUTINE HEALING, SUBSEQUENT ENCOUNTER: Primary | ICD-10-CM

## 2021-11-15 PROCEDURE — 73610 X-RAY EXAM OF ANKLE: CPT | Mod: LT | Performed by: RADIOLOGY

## 2021-11-15 PROCEDURE — 73700 CT LOWER EXTREMITY W/O DYE: CPT | Mod: TC | Performed by: RADIOLOGY

## 2021-11-15 PROCEDURE — 99214 OFFICE O/P EST MOD 30 MIN: CPT | Performed by: PEDIATRICS

## 2021-11-15 RX ORDER — OXYCODONE HYDROCHLORIDE 5 MG/1
5 TABLET ORAL EVERY 6 HOURS PRN
Qty: 15 TABLET | Refills: 0 | Status: SHIPPED | OUTPATIENT
Start: 2021-11-15 | End: 2022-04-01

## 2021-11-15 NOTE — TELEPHONE ENCOUNTER
Results for orders placed or performed in visit on 11/15/21   CT Ankle Left w/o Contrast    Narrative    CT ANKLE LEFT WITHOUT CONTRAST November 15, 2021 1:38 PM     HISTORY: Fracture, ankle. Other closed fracture of distal end of left  fibula with routine healing, subsequent encounter.    TECHNIQUE: Axial scans were performed through the right ankle with  sagittal and coronal reconstruction. Radiation dose for this scan was  reduced using automated exposure control, adjustment of the mA and/or  kV according to patient size, or iterative reconstruction technique.    COMPARISON: X-ray from today.    FINDINGS: Mildly impacted intra-articular fracture of the posterior  malleolus extending into the posterior aspect of the medial malleolus.  Known comminuted mildly displaced fracture distal fibular diaphysis.    Chronic appearing subchondral sclerosis and cyst formation medial  corner talar dome likely residual from prior osteochondral injury or  chronic osteochondritis dissecans.    Mild subcutaneous edema about the ankle.      Impression    IMPRESSION:  1. Mildly impacted intra-articular fracture posterior malleolus with  extension into the posterior medial malleolus.  2. Known comminuted mildly displaced fracture distal fibular  diaphysis.  3. 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    SOURAV FOX MD         SYSTEM ID:  [id number]

## 2021-11-15 NOTE — LETTER
11/15/2021         RE: Marianna Calderon  [id number] [facility]  [place] [address]        Dear Colleague,    Thank you for referring your patient, Marianna Calderon, to the [facility] [place]. Please see a copy of my visit note below.    ASSESSMENT & PLAN    Marianna was seen today for recheck.    Diagnoses and all orders for this visit:    Other closed fracture of distal end of left fibula with routine healing, subsequent encounter  -     XR Ankle Left G/E 3 Views; Future  -     oxyCODONE (ROXICODONE) 5 MG tablet; Take 1 tablet (5 mg) by mouth every 6 hours as needed for severe pain  -     CT Ankle Left w/o Contrast; Future      Previously noted distal fibula fracture, now there appears to be an intra-articular posterior malleolar fracture as well.  Continue with current support for the ankle.  Limit weightbearing as much as possible.  Ambulatory aid as able.  Knee scooter was denied by insurance, but she did not want to pay the $100 per month rental pay anyway.  Plan to obtain CT scan of the ankle next, better characterization of the fracture is present.  From there, likely to connect with 1 of a foot/ankle surgeons for further review, with potential for referral on for further care depending on results of the CT scan.  Additional prescription for pain medication placed today, she inquired.   Pertinent potential adverse effect profile of prescribed medication(s) was discussed with the patient, who expressed understanding.  Contact pt with CT results.  Questions answered. Discussed signs and symptoms that may indicate more serious issues; the patient was instructed to seek appropriate care if noted. Marianna indicates understanding of these issues and agrees with the plan.          See Patient Instructions  Patient Instructions   CT scan left ankle next, given question for possible posterior malleolar fracture on the x-rays from today, also better characterize the fibula fracture.   Discussed removing the boot more routinely if concerns about skin, but not moving the ankle when out of the boot. Ok to use a regular sock.  We will plan to contact you with CT results.  I also plan to send a note to one of the foot/ankle surgeons regarding this, for an additional opinion on the fracture(s).  Additional pain medication prescription placed as well.    If you have any further questions for your physician or physician s care team you can call [phone] and use option 3 to leave a voice message. Calls received during business hours will be returned same day.          [facility]    SUBJECTIVE- Interim History November 15, 2021    Chief Complaint   Patient presents with     Left Ankle - RECHECK       Marianna Calderon is a 81 year old female who is seen in f/u up for Other closed fracture of distal end of left fibula with routine healing, subsequent encounter. Since last visit on 11/8/21 patient has continued with the CAM boot.  She has been using a wheelchair.  Pain with pressure over her ankle, especially with her dog bumping it.  .  She has been off of her ankle.    -11/6/21  Now ~ 9 dayss from initial injury.  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     Would be interested in a refill of her pain medicaiton.      Worsened by: pressure, dog bumping.    Better with: rest, CAM boot     The patient is seen with their daughter.  **    No family history pertinent to patient's problem today.     REVIEW OF SYSTEMS:  Review of Systems     OBJECTIVE:  Pulse 82   Resp 18      GENERAL APPEARANCE: alert and no distress   GAIT: wheelchair    Limited left ankle ROM with pain, tender about the ankle joint.  Also tender distal fibula, known fracture site.      RADIOLOGY:  Final results and radiologist's interpretation, available in the [facility] health record.  Images were reviewed with the patient in the office today.  My  personal interpretation of the performed imaging: again note oblique distal fibula fracture, mild displacement. Ankle mortise appears intact.  There also appears to be a posterior malleolar fracture, not clearly present on previous x-ray.    XR Ankle Left G/E 3 Views    Narrative    XR LEFT ANKLE THREE OR MORE VIEWS   11/15/2021 11:55 AM     HISTORY: Other closed fracture of distal end of left fibula with  routine healing, subsequent encounter.    COMPARISON: None.      Impression    IMPRESSION: There is a mildly displaced mildly comminuted fracture of  the distal fibular metadiaphyseal region approximately 3 cm above the  tibiotalar joint level. There is irregularity of the cortex of the  posterior malleolus suspicious for nondisplaced posterior malleolar  fracture. Medial malleolus appears intact. Mortise appears intact.  Talar dome is intact.    SOURAV FOX MD         SYSTEM ID:  [id number]             Again, thank you for allowing me to participate in the care of your patient.        Sincerely,        Jean Mcknight DO

## 2021-11-15 NOTE — PROGRESS NOTES
ASSESSMENT & PLAN    Marianna was seen today for recheck.    Diagnoses and all orders for this visit:    Other closed fracture of distal end of left fibula with routine healing, subsequent encounter  -     XR Ankle Left G/E 3 Views; Future  -     oxyCODONE (ROXICODONE) 5 MG tablet; Take 1 tablet (5 mg) by mouth every 6 hours as needed for severe pain  -     CT Ankle Left w/o Contrast; Future      Previously noted distal fibula fracture, now there appears to be an intra-articular posterior malleolar fracture as well.  Continue with current support for the ankle.  Limit weightbearing as much as possible.  Ambulatory aid as able.  Knee scooter was denied by insurance, but she did not want to pay the $100 per month rental pay anyway.  Plan to obtain CT scan of the ankle next, better characterization of the fracture is present.  From there, likely to connect with 1 of a foot/ankle surgeons for further review, with potential for referral on for further care depending on results of the CT scan.  Additional prescription for pain medication placed today, she inquired.   Pertinent potential adverse effect profile of prescribed medication(s) was discussed with the patient, who expressed understanding.  Contact pt with CT results.  Questions answered. Discussed signs and symptoms that may indicate more serious issues; the patient was instructed to seek appropriate care if noted. Marianna indicates understanding of these issues and agrees with the plan.          See Patient Instructions  Patient Instructions   CT scan left ankle next, given question for possible posterior malleolar fracture on the x-rays from today, also better characterize the fibula fracture.  Discussed removing the boot more routinely if concerns about skin, but not moving the ankle when out of the boot. Ok to use a regular sock.  We will plan to contact you with CT results.  I also plan to send a note to one of the foot/ankle surgeons regarding this, for an  additional opinion on the fracture(s).  Additional pain medication prescription placed as well.    If you have any further questions for your physician or physician s care team you can call [phone] and use option 3 to leave a voice message. Calls received during business hours will be returned same day.          Jean Mcknight [facility]    SUBJECTIVE- Interim History November 15, 2021    Chief Complaint   Patient presents with     Left Ankle - RECHECK       Marianna Calderon is a 81 year old female who is seen in f/u up for Other closed fracture of distal end of left fibula with routine healing, subsequent encounter. Since last visit on 11/8/21 patient has continued with the CAM boot.  She has been using a wheelchair.  Pain with pressure over her ankle, especially with her dog bumping it.  .  She has been off of her ankle.    -11/6/21  Now ~ 9 dayss from initial injury.  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     Would be interested in a refill of her pain medicaiton.      Worsened by: pressure, dog bumping.    Better with: rest, CAM boot     The patient is seen with their daughter.  **    No family history pertinent to patient's problem today.     REVIEW OF SYSTEMS:  Review of Systems     OBJECTIVE:  Pulse 82   Resp 18      GENERAL APPEARANCE: alert and no distress   GAIT: wheelchair    Limited left ankle ROM with pain, tender about the ankle joint.  Also tender distal fibula, known fracture site.      RADIOLOGY:  Final results and radiologist's interpretation, available in the [facility] health record.  Images were reviewed with the patient in the office today.  My personal interpretation of the performed imaging: again note oblique distal fibula fracture, mild displacement. Ankle mortise appears intact.  There also appears to be a posterior malleolar fracture, not clearly present on previous x-ray.    XR Ankle Left G/E 3 Views     Narrative    XR LEFT ANKLE THREE OR MORE VIEWS   11/15/2021 11:55 AM     HISTORY: Other closed fracture of distal end of left fibula with  routine healing, subsequent encounter.    COMPARISON: None.      Impression    IMPRESSION: There is a mildly displaced mildly comminuted fracture of  the distal fibular metadiaphyseal region approximately 3 cm above the  tibiotalar joint level. There is irregularity of the cortex of the  posterior malleolus suspicious for nondisplaced posterior malleolar  fracture. Medial malleolus appears intact. Mortise appears intact.  Talar dome is intact.    SOURAV FOX MD         SYSTEM ID:  [id number]

## 2021-11-15 NOTE — PATIENT INSTRUCTIONS
CT scan left ankle next, given question for possible posterior malleolar fracture on the x-rays from today, also better characterize the fibula fracture.  Discussed removing the boot more routinely if concerns about skin, but not moving the ankle when out of the boot. Ok to use a regular sock.  We will plan to contact you with CT results.  I also plan to send a note to one of the foot/ankle surgeons regarding this, for an additional opinion on the fracture(s).  Additional pain medication prescription placed as well.    If you have any further questions for your physician or physician s care team you can call [phone] and use option 3 to leave a voice message. Calls received during business hours will be returned same day.

## 2021-11-16 NOTE — TELEPHONE ENCOUNTER
Mildly displaced distal fibula fracture, which was known all along.  Posterior malleolar fracture confirmed, nondisplaced.  The ankle joint appears intact otherwise.  I had Dr Malone review the imaging; he does not see a need for surgery, which is good.  Would advise continuing to use the boot for support regularly.  Try to be nonweightbearing as much as possible, ideally no walking on foot as much as possible.  As now ~10 days from injury, would recheck in about 2.5-3 weeks, or around 4 weeks from the injury, for recheck and repeat ankle x-ray.  Otherwise, if they would like to see Dr Malone for further discussion, can refer.  I would be happy to have a visit with the patient (in person, by video, or by phone) to discuss further if that would be helpful.  Thanks.  Jean Mcknight DO, CAQ

## 2021-11-16 NOTE — TELEPHONE ENCOUNTER
Spoke to daughter discussed CT results and recommendations.  They will continue to have patient NWB as much as safely possible.  Follow up appointment was scheduled with Dr Mcknight on 12/6/21.    Blair Anderson ATC

## 2021-12-06 ENCOUNTER — OFFICE VISIT (OUTPATIENT)
Dept: ORTHOPEDICS | Facility: CLINIC | Age: 81
End: 2021-12-06
Payer: COMMERCIAL

## 2021-12-06 ENCOUNTER — ANCILLARY PROCEDURE (OUTPATIENT)
Dept: GENERAL RADIOLOGY | Facility: CLINIC | Age: 81
End: 2021-12-06
Attending: PEDIATRICS
Payer: COMMERCIAL

## 2021-12-06 VITALS — RESPIRATION RATE: 16 BRPM | HEART RATE: 88 BPM

## 2021-12-06 DIAGNOSIS — S82.392D CLOSED FRACTURE OF POSTERIOR MALLEOLUS OF LEFT TIBIA WITH ROUTINE HEALING, SUBSEQUENT ENCOUNTER: ICD-10-CM

## 2021-12-06 DIAGNOSIS — S82.832D OTHER CLOSED FRACTURE OF DISTAL END OF LEFT FIBULA WITH ROUTINE HEALING, SUBSEQUENT ENCOUNTER: ICD-10-CM

## 2021-12-06 DIAGNOSIS — S82.832D OTHER CLOSED FRACTURE OF DISTAL END OF LEFT FIBULA WITH ROUTINE HEALING, SUBSEQUENT ENCOUNTER: Primary | ICD-10-CM

## 2021-12-06 PROCEDURE — 99213 OFFICE O/P EST LOW 20 MIN: CPT | Performed by: PEDIATRICS

## 2021-12-06 PROCEDURE — 73610 X-RAY EXAM OF ANKLE: CPT | Mod: LT | Performed by: RADIOLOGY

## 2021-12-06 NOTE — LETTER
12/6/2021         RE: Marianna Calderon  [id number] [facility]  [place] [address]        Dear Colleague,    Thank you for referring your patient, Marianna Calderon, to the [facility] [place]. Please see a copy of my visit note below.    ASSESSMENT & PLAN    Marianna was seen today for recheck.    Diagnoses and all orders for this visit:    Other closed fracture of distal end of left fibula with routine healing, subsequent encounter  -     XR Ankle Left G/E 3 Views; Future    Closed fracture of posterior malleolus of left tibia with routine healing, subsequent encounter  -     XR Ankle Left G/E 3 Views; Future      Fracture distal fibula stable. Difficult to see posterior malleolar fracture.  Continue with cam walker. Ok to start increasing WB if comfortable. Discussed ambulatory aid.  F/u 3-4 weeks, repeat x-ray.  Questions answered. Discussed signs and symptoms that may indicate more serious issues; the patient was instructed to seek appropriate care if noted. Marianna indicates understanding of these issues and agrees with the plan.      See Patient Instructions  Patient Instructions   X-rays show some healing of the distal fibula fracture.  Posterior malleolar fracture is not well identified on the x-rays.  Ankle mortise appears to remain intact.  Okay to start some weightbearing for the left lower extremity, if comfortable.  Advise ambulatory aid such as cane or walker.  Plan recheck 3 to 4 weeks, with repeat x-rays of the left ankle, sooner if needed.    If you have any further questions for your physician or physician s care team you can call [phone] and use option 3 to leave a voice message. Calls received during business hours will be returned same day.        Jean Mcknight DO  [facility] [place]    SUBJECTIVE- Interim History December 6, 2021    Chief Complaint   Patient presents with     Left Ankle - RECHECK       Marianna PAIZ  Kvng is a 81 year old female who is seen in f/u up for Other closed fracture of distal end of left fibula with routine healing, subsequent encounter. Since last visit on 11/15/21 patient has continued with the use of the CAM boot.  Has continued with the use of the wheelchair, so she has not been walking.  .  - 11/6/21 Now ~ 4 weeks from initial injury      No family history pertinent to patient's problem today.   **  No pain medication past few days.      REVIEW OF SYSTEMS:  Review of Systems      OBJECTIVE:  Pulse 88   Resp 16      GENERAL APPEARANCE: alert and no distress   GAIT: wheelchair      RADIOLOGY:  Final results and radiologist's interpretation, available in the [facility] health record.  Images were reviewed with the patient in the office today.  My personal interpretation of the performed imaging: some callus formation distal fibula fracture, alignment unchanged. Posterior malleolus fracture difficult to see. Mortise appears intact.    Recent Results (from the past 24 hour(s))   XR Ankle Left G/E 3 Views    Narrative    ANKLE THREE VIEWS LEFT 12/6/2021 11:15 AM     HISTORY: Other closed fracture of distal end of left fibula with  routine healing, subsequent encounter    COMPARISON: CT from 11/15/2021      Impression    IMPRESSION: Oblique distal fibular and medial malleolus fractures with  similar alignment. Interval healing with mild callus formation. The  posterior malleolus fracture is poorly visualized. The ankle mortise  is congruent. Otherwise unchanged.    BOOGIE GIRON MD         SYSTEM ID:  [id number]               Again, thank you for allowing me to participate in the care of your patient.        Sincerely,        Jean Mcknight DO

## 2021-12-06 NOTE — PROGRESS NOTES
ASSESSMENT & PLAN    Marianna was seen today for recheck.    Diagnoses and all orders for this visit:    Other closed fracture of distal end of left fibula with routine healing, subsequent encounter  -     XR Ankle Left G/E 3 Views; Future    Closed fracture of posterior malleolus of left tibia with routine healing, subsequent encounter  -     XR Ankle Left G/E 3 Views; Future      Fracture distal fibula stable. Difficult to see posterior malleolar fracture.  Continue with cam walker. Ok to start increasing WB if comfortable. Discussed ambulatory aid.  F/u 3-4 weeks, repeat x-ray.  Questions answered. Discussed signs and symptoms that may indicate more serious issues; the patient was instructed to seek appropriate care if noted. Marianna indicates understanding of these issues and agrees with the plan.      See Patient Instructions  Patient Instructions   X-rays show some healing of the distal fibula fracture.  Posterior malleolar fracture is not well identified on the x-rays.  Ankle mortise appears to remain intact.  Okay to start some weightbearing for the left lower extremity, if comfortable.  Advise ambulatory aid such as cane or walker.  Plan recheck 3 to 4 weeks, with repeat x-rays of the left ankle, sooner if needed.    If you have any further questions for your physician or physician s care team you can call [phone] and use option 3 to leave a voice message. Calls received during business hours will be returned same day.        Jean Mcknight [facility]    SUBJECTIVE- Interim History December 6, 2021    Chief Complaint   Patient presents with     Left Ankle - RECHECK       Marianna Calderon is a 81 year old female who is seen in f/u up for Other closed fracture of distal end of left fibula with routine healing, subsequent encounter. Since last visit on 11/15/21 patient has continued with the use of the CAM boot.  Has continued with the use of the  wheelchair, so she has not been walking.  .  - 11/6/21 Now ~ 4 weeks from initial injury      No family history pertinent to patient's problem today.   **  No pain medication past few days.      REVIEW OF SYSTEMS:  Review of Systems      OBJECTIVE:  Pulse 88   Resp 16      GENERAL APPEARANCE: alert and no distress   GAIT: wheelchair      RADIOLOGY:  Final results and radiologist's interpretation, available in the [facility] health record.  Images were reviewed with the patient in the office today.  My personal interpretation of the performed imaging: some callus formation distal fibula fracture, alignment unchanged. Posterior malleolus fracture difficult to see. Mortise appears intact.    Recent Results (from the past 24 hour(s))   XR Ankle Left G/E 3 Views    Narrative    ANKLE THREE VIEWS LEFT 12/6/2021 11:15 AM     HISTORY: Other closed fracture of distal end of left fibula with  routine healing, subsequent encounter    COMPARISON: CT from 11/15/2021      Impression    IMPRESSION: Oblique distal fibular and medial malleolus fractures with  similar alignment. Interval healing with mild callus formation. The  posterior malleolus fracture is poorly visualized. The ankle mortise  is congruent. Otherwise unchanged.    BOOGIE GIRON MD         SYSTEM ID:  [id number]

## 2021-12-06 NOTE — PATIENT INSTRUCTIONS
X-rays show some healing of the distal fibula fracture.  Posterior malleolar fracture is not well identified on the x-rays.  Ankle mortise appears to remain intact.  Okay to start some weightbearing for the left lower extremity, if comfortable.  Advise ambulatory aid such as cane or walker.  Plan recheck 3 to 4 weeks, with repeat x-rays of the left ankle, sooner if needed.    If you have any further questions for your physician or physician s care team you can call [phone] and use option 3 to leave a voice message. Calls received during business hours will be returned same day.

## 2022-01-05 ENCOUNTER — OFFICE VISIT (OUTPATIENT)
Dept: ORTHOPEDICS | Facility: CLINIC | Age: 82
End: 2022-01-05
Payer: COMMERCIAL

## 2022-01-05 ENCOUNTER — ANCILLARY PROCEDURE (OUTPATIENT)
Dept: GENERAL RADIOLOGY | Facility: CLINIC | Age: 82
End: 2022-01-05
Attending: PEDIATRICS
Payer: COMMERCIAL

## 2022-01-05 VITALS — RESPIRATION RATE: 18 BRPM | HEART RATE: 84 BPM

## 2022-01-05 DIAGNOSIS — S82.832D OTHER CLOSED FRACTURE OF DISTAL END OF LEFT FIBULA WITH ROUTINE HEALING, SUBSEQUENT ENCOUNTER: ICD-10-CM

## 2022-01-05 DIAGNOSIS — S82.832D OTHER CLOSED FRACTURE OF DISTAL END OF LEFT FIBULA WITH ROUTINE HEALING, SUBSEQUENT ENCOUNTER: Primary | ICD-10-CM

## 2022-01-05 DIAGNOSIS — S82.392D CLOSED FRACTURE OF POSTERIOR MALLEOLUS OF LEFT TIBIA WITH ROUTINE HEALING, SUBSEQUENT ENCOUNTER: ICD-10-CM

## 2022-01-05 PROCEDURE — 73610 X-RAY EXAM OF ANKLE: CPT | Mod: LT | Performed by: RADIOLOGY

## 2022-01-05 PROCEDURE — 99213 OFFICE O/P EST LOW 20 MIN: CPT | Performed by: PEDIATRICS

## 2022-01-05 NOTE — LETTER
1/5/2022         RE: Marianna Calderon  [id number] [facility]  [place] [address]        Dear Colleague,    Thank you for referring your patient, Marianna Calderon, to the [facility] [place]. Please see a copy of my visit note below.    ASSESSMENT & PLAN    Marianna was seen today for recheck.    Diagnoses and all orders for this visit:    Other closed fracture of distal end of left fibula with routine healing, subsequent encounter  -     XR Ankle Left G/E 3 Views; Future  -     Ankle/Foot Bracing Supplies Order for DME - ONLY FOR DME    Closed fracture of posterior malleolus of left tibia with routine healing, subsequent encounter  -     XR Ankle Left G/E 3 Views; Future  -     Ankle/Foot Bracing Supplies Order for DME - ONLY FOR DME      Radiographic healing, with some clinical improvement as well.  I think PT would be of benefit, offered, she declined.  Discussed weaning boot, to stirrup splint. Device provided. She may discontinue boot overnight, but she has apprehension about this, and likely will want to continue overnight use.  She prefers continued boot and monitoring, so plan recheck 3-4 weeks, with one more x-ray.  Questions answered. Discussed signs and symptoms that may indicate more serious issues; the patient was instructed to seek appropriate care if noted. Marianna indicates understanding of these issues and agrees with the plan.      See Patient Instructions  Patient Instructions   X-rays from today show progressive healing of the fracture in the distal fibula.  The fracture of the posterior malleolus is not well seen, but is presumed to be healing.  We discussed weaning the Cam walker.  You may start with 30 to 60 minutes out of the boot, and assuming comfortable, add 30 to 60 minutes additionally each day.  Once out of the boot for more than half of your waking hours, you may then discontinue it entirely, assuming you remain comfortable.  Also, you may discontinue  overnight use of the cam walker when you are comfortable, but if concerns about having to get out of the bed and walk at night, such as with using the bathroom, it is okay to continue with overnight use.  We also discussed transitioning into a stirrup splint, reviewed today.  Ambulatory aid such as cane or crutches is as desired, not required provided comfortable and stable.  I would encourage physical therapy.  Contact clinic if interested in physical therapy referral.  Otherwise, simple home exercises reviewed today.  Anticipate recheck in 3 to 4 weeks, with repeat x-rays of the ankle, sooner if needed.    If you have any further questions for your physician or physician s care team you can call [phone] and use option 3 to leave a voice message. Calls received during business hours will be returned same day.        Jean Mcknight [facility]    SUBJECTIVE- Interim History January 5, 2022    Chief Complaint   Patient presents with     Left Ankle - RECHECK       Marianna Calderon is a 81 year old female who is seen in f/u up for    Other closed fracture of distal end of left fibula with routine healing, subsequent encounter  Closed fracture of posterior malleolus of left tibia with routine healing, subsequent encounter. Since last visit on 12/6/21 patient has continued with the CAM boot.  Does minimal walking with her cane.  She has noticed some pain in her hand from crawling on the floor and using her cane.  She has continued to sleep in the boot.   Presents today in a wheelchair.     - 11/6/21, Now ~ 8.5 weeks from initial injury    **  No pain medication for past couple weeks.        REVIEW OF SYSTEMS:  Review of Systems      OBJECTIVE:  Pulse 84   Resp 18      In wheelchair  Cam walker fitting well  Full toe motion    RADIOLOGY:  Final results and radiologist's interpretation, available in the [facility] health record.  Images were reviewed with the patient in  the office today.  My personal interpretation of the performed imaging: healing distal fibula fracture. Mortise intact. Posterior malleolar fracture not well seen.      Recent Results (from the past 24 hour(s))   XR Ankle Left G/E 3 Views    Narrative    ANKLE LEFT THREE OR MORE VIEWS January 5, 2022 10:37 AM     HISTORY: Closed fracture of distal end of left fibula with routine  healing, subsequent encounter. Closed fracture of posterior malleolus  of left tibia with routine healing, subsequent encounter.    COMPARISON: 12/6/2021 x-ray.      Impression    IMPRESSION: No significant change in position or alignment of mildly  displaced distal fibular fracture. There is early callus formation  seen.    SOURAV FOX MD         SYSTEM ID:  [id number]                 Again, thank you for allowing me to participate in the care of your patient.        Sincerely,        Jean Mcknight DO

## 2022-01-05 NOTE — PATIENT INSTRUCTIONS
X-rays from today show progressive healing of the fracture in the distal fibula.  The fracture of the posterior malleolus is not well seen, but is presumed to be healing.  We discussed weaning the Cam walker.  You may start with 30 to 60 minutes out of the boot, and assuming comfortable, add 30 to 60 minutes additionally each day.  Once out of the boot for more than half of your waking hours, you may then discontinue it entirely, assuming you remain comfortable.  Also, you may discontinue overnight use of the cam walker when you are comfortable, but if concerns about having to get out of the bed and walk at night, such as with using the bathroom, it is okay to continue with overnight use.  We also discussed transitioning into a stirrup splint, reviewed today.  Ambulatory aid such as cane or crutches is as desired, not required provided comfortable and stable.  I would encourage physical therapy.  Contact clinic if interested in physical therapy referral.  Otherwise, simple home exercises reviewed today.  Anticipate recheck in 3 to 4 weeks, with repeat x-rays of the ankle, sooner if needed.    If you have any further questions for your physician or physician s care team you can call [phone] and use option 3 to leave a voice message. Calls received during business hours will be returned same day.

## 2022-01-05 NOTE — PROGRESS NOTES
ASSESSMENT & PLAN    Marianna was seen today for recheck.    Diagnoses and all orders for this visit:    Other closed fracture of distal end of left fibula with routine healing, subsequent encounter  -     XR Ankle Left G/E 3 Views; Future  -     Ankle/Foot Bracing Supplies Order for DME - ONLY FOR DME    Closed fracture of posterior malleolus of left tibia with routine healing, subsequent encounter  -     XR Ankle Left G/E 3 Views; Future  -     Ankle/Foot Bracing Supplies Order for DME - ONLY FOR DME      Radiographic healing, with some clinical improvement as well.  I think PT would be of benefit, offered, she declined.  Discussed weaning boot, to stirrup splint. Device provided. She may discontinue boot overnight, but she has apprehension about this, and likely will want to continue overnight use.  She prefers continued boot and monitoring, so plan recheck 3-4 weeks, with one more x-ray.  Questions answered. Discussed signs and symptoms that may indicate more serious issues; the patient was instructed to seek appropriate care if noted. Marianna indicates understanding of these issues and agrees with the plan.      See Patient Instructions  Patient Instructions   X-rays from today show progressive healing of the fracture in the distal fibula.  The fracture of the posterior malleolus is not well seen, but is presumed to be healing.  We discussed weaning the Cam walker.  You may start with 30 to 60 minutes out of the boot, and assuming comfortable, add 30 to 60 minutes additionally each day.  Once out of the boot for more than half of your waking hours, you may then discontinue it entirely, assuming you remain comfortable.  Also, you may discontinue overnight use of the cam walker when you are comfortable, but if concerns about having to get out of the bed and walk at night, such as with using the bathroom, it is okay to continue with overnight use.  We also discussed transitioning into a stirrup splint, reviewed  today.  Ambulatory aid such as cane or crutches is as desired, not required provided comfortable and stable.  I would encourage physical therapy.  Contact clinic if interested in physical therapy referral.  Otherwise, simple home exercises reviewed today.  Anticipate recheck in 3 to 4 weeks, with repeat x-rays of the ankle, sooner if needed.    If you have any further questions for your physician or physician s care team you can call [phone] and use option 3 to leave a voice message. Calls received during business hours will be returned same day.        [facility]    SUBJECTIVE- Interim History January 5, 2022    Chief Complaint   Patient presents with     Left Ankle - RECHECK       Marianna Calderon is a 81 year old female who is seen in f/u up for    Other closed fracture of distal end of left fibula with routine healing, subsequent encounter  Closed fracture of posterior malleolus of left tibia with routine healing, subsequent encounter. Since last visit on 12/6/21 patient has continued with the CAM boot.  Does minimal walking with her cane.  She has noticed some pain in her hand from crawling on the floor and using her cane.  She has continued to sleep in the boot.   Presents today in a wheelchair.     - 11/6/21, Now ~ 8.5 weeks from initial injury    **  No pain medication for past couple weeks.        REVIEW OF SYSTEMS:  Review of Systems      OBJECTIVE:  Pulse 84   Resp 18      In wheelchair  Cam walker fitting well  Full toe motion    RADIOLOGY:  Final results and radiologist's interpretation, available in the [facility] health record.  Images were reviewed with the patient in the office today.  My personal interpretation of the performed imaging: healing distal fibula fracture. Mortise intact. Posterior malleolar fracture not well seen.      Recent Results (from the past 24 hour(s))   XR Ankle Left G/E 3 Views    Narrative    ANKLE LEFT  THREE OR MORE VIEWS January 5, 2022 10:37 AM     HISTORY: Closed fracture of distal end of left fibula with routine  healing, subsequent encounter. Closed fracture of posterior malleolus  of left tibia with routine healing, subsequent encounter.    COMPARISON: 12/6/2021 x-ray.      Impression    IMPRESSION: No significant change in position or alignment of mildly  displaced distal fibular fracture. There is early callus formation  seen.    SOURAV FOX MD         SYSTEM ID:  [id number]

## 2022-04-01 ENCOUNTER — ANCILLARY PROCEDURE (OUTPATIENT)
Dept: GENERAL RADIOLOGY | Facility: CLINIC | Age: 82
End: 2022-04-01
Attending: INTERNAL MEDICINE
Payer: COMMERCIAL

## 2022-04-01 ENCOUNTER — OFFICE VISIT (OUTPATIENT)
Dept: FAMILY MEDICINE | Facility: CLINIC | Age: 82
End: 2022-04-01
Payer: COMMERCIAL

## 2022-04-01 VITALS
BODY MASS INDEX: 31.71 KG/M2 | OXYGEN SATURATION: 97 % | SYSTOLIC BLOOD PRESSURE: 127 MMHG | WEIGHT: 179 LBS | HEART RATE: 95 BPM | DIASTOLIC BLOOD PRESSURE: 72 MMHG

## 2022-04-01 DIAGNOSIS — I10 ESSENTIAL HYPERTENSION: Primary | ICD-10-CM

## 2022-04-01 DIAGNOSIS — M19.90 INFLAMMATORY ARTHRITIS: ICD-10-CM

## 2022-04-01 LAB
ANION GAP SERPL CALCULATED.3IONS-SCNC: 8 MMOL/L (ref 3–14)
BASOPHILS # BLD AUTO: 0.1 10E3/UL (ref 0–0.2)
BASOPHILS NFR BLD AUTO: 1 %
BUN SERPL-MCNC: 17 MG/DL (ref 7–30)
CALCIUM SERPL-MCNC: 9.2 MG/DL (ref 8.5–10.1)
CHLORIDE BLD-SCNC: 108 MMOL/L (ref 94–109)
CO2 SERPL-SCNC: 26 MMOL/L (ref 20–32)
CREAT SERPL-MCNC: 1.34 MG/DL (ref 0.52–1.04)
CRP SERPL-MCNC: 5 MG/L (ref 0–8)
EOSINOPHIL # BLD AUTO: 0.6 10E3/UL (ref 0–0.7)
EOSINOPHIL NFR BLD AUTO: 6 %
ERYTHROCYTE [DISTWIDTH] IN BLOOD BY AUTOMATED COUNT: 14.7 % (ref 10–15)
ERYTHROCYTE [SEDIMENTATION RATE] IN BLOOD BY WESTERGREN METHOD: 12 MM/HR (ref 0–30)
GFR SERPL CREATININE-BSD FRML MDRD: 39 ML/MIN/1.73M2
GLUCOSE BLD-MCNC: 128 MG/DL (ref 70–99)
HCT VFR BLD AUTO: 41.3 % (ref 35–47)
HGB BLD-MCNC: 13.1 G/DL (ref 11.7–15.7)
LYMPHOCYTES # BLD AUTO: 3.1 10E3/UL (ref 0.8–5.3)
LYMPHOCYTES NFR BLD AUTO: 29 %
MCH RBC QN AUTO: 27.5 PG (ref 26.5–33)
MCHC RBC AUTO-ENTMCNC: 31.7 G/DL (ref 31.5–36.5)
MCV RBC AUTO: 87 FL (ref 78–100)
MONOCYTES # BLD AUTO: 0.9 10E3/UL (ref 0–1.3)
MONOCYTES NFR BLD AUTO: 8 %
NEUTROPHILS # BLD AUTO: 5.9 10E3/UL (ref 1.6–8.3)
NEUTROPHILS NFR BLD AUTO: 56 %
PLATELET # BLD AUTO: 448 10E3/UL (ref 150–450)
POTASSIUM BLD-SCNC: 3.4 MMOL/L (ref 3.4–5.3)
RBC # BLD AUTO: 4.76 10E6/UL (ref 3.8–5.2)
SODIUM SERPL-SCNC: 142 MMOL/L (ref 133–144)
WBC # BLD AUTO: 10.5 10E3/UL (ref 4–11)

## 2022-04-01 PROCEDURE — 86038 ANTINUCLEAR ANTIBODIES: CPT | Performed by: INTERNAL MEDICINE

## 2022-04-01 PROCEDURE — 85025 COMPLETE CBC W/AUTO DIFF WBC: CPT | Performed by: INTERNAL MEDICINE

## 2022-04-01 PROCEDURE — 80048 BASIC METABOLIC PNL TOTAL CA: CPT | Performed by: INTERNAL MEDICINE

## 2022-04-01 PROCEDURE — 99214 OFFICE O/P EST MOD 30 MIN: CPT | Performed by: INTERNAL MEDICINE

## 2022-04-01 PROCEDURE — 85652 RBC SED RATE AUTOMATED: CPT | Performed by: INTERNAL MEDICINE

## 2022-04-01 PROCEDURE — 72100 X-RAY EXAM L-S SPINE 2/3 VWS: CPT | Performed by: RADIOLOGY

## 2022-04-01 PROCEDURE — 36415 COLL VENOUS BLD VENIPUNCTURE: CPT | Performed by: INTERNAL MEDICINE

## 2022-04-01 PROCEDURE — 86140 C-REACTIVE PROTEIN: CPT | Performed by: INTERNAL MEDICINE

## 2022-04-01 PROCEDURE — 86431 RHEUMATOID FACTOR QUANT: CPT | Performed by: INTERNAL MEDICINE

## 2022-04-01 PROCEDURE — 86618 LYME DISEASE ANTIBODY: CPT | Performed by: INTERNAL MEDICINE

## 2022-04-01 RX ORDER — CELECOXIB 200 MG/1
200 CAPSULE ORAL DAILY
Qty: 31 CAPSULE | Refills: 4 | Status: SHIPPED | OUTPATIENT
Start: 2022-04-01 | End: 2022-09-13

## 2022-04-01 ASSESSMENT — ENCOUNTER SYMPTOMS: HYPERTENSION: 1

## 2022-04-01 NOTE — PROGRESS NOTES
Assessment & Plan   Problem List Items Addressed This Visit     Essential hypertension - Primary      Other Visit Diagnoses     Inflammatory arthritis        Relevant Medications    celecoxib (CELEBREX) 200 MG capsule    Other Relevant Orders    CBC with platelets and differential    ESR: Erythrocyte sedimentation rate    CRP, inflammation    Rheumatoid factor    Anti Nuclear Ariana IgG by IFA with Reflex    Lyme Disease Ariana with reflex to WB Serum    Basic metabolic panel  (Ca, Cl, CO2, Creat, Gluc, K, Na, BUN)    XR Lumbar Spine 2/3 Views                  Work on weight loss  Regular exercise    No follow-ups on file.    Goran Lima MD  [facility] SOL Cabral is a 81 year old who presents for the following health issues  accompanied by her daughter.    Hypertension   This is a new problem. The current episode started more than 1 week ago. The problem has been gradually worsening. There are no associated agents to hypertension. Risk factors include smoking/tobacco exposure and stress.   Arthritis  This is a new problem. The current episode started more than 1 year ago. The problem occurs daily. The problem has been gradually worsening. The symptoms are aggravated by twisting, walking and standing. She has tried acetaminophen, lying down and NSAIDs for the symptoms. The treatment provided mild relief.   History of Present Illness       Hypertension: She presents for follow up of hypertension.  She does check blood pressure  regularly outside of the clinic. Outside blood pressures have been over 140/90. She does not follow a low salt diet.     Reason for visit:  Rheumatologist referral and check bp  Symptom onset:  More than a month  Symptoms include:  Joint pain  Symptom intensity:  Mild  Symptom progression:  Staying the same  Had these symptoms before:  No  What makes it worse:  Standing walking  What makes it better:  Resting and tazanidine and    She eats 2-3 servings of  fruits and vegetables daily.She consumes 3 sweetened beverage(s) daily.She exercises with enough effort to increase her heart rate 9 or less minutes per day.  She exercises with enough effort to increase her heart rate 3 or less days per week.   She is taking medications regularly.       Started having issues in October and fell and broke leg    For years before , got worse as activity decreased and son  Got bigger and heavier   Low back and SI joint   Knees too and   Right shoulder pain and injury as well   Trigger fingers right hand    Voltaren, tylenol           Review of Systems   Musculoskeletal: Positive for arthritis.      Constitutional, HEENT, cardiovascular, pulmonary, gi and gu systems are negative, except as otherwise noted.      Objective    /72 (BP Location: Right arm, Patient Position: Chair, Cuff Size: Adult Regular)   Pulse 95   Wt 81.2 kg (179 lb)   SpO2 97%   BMI 31.71 kg/m    Body mass index is 31.71 kg/m .  Physical Exam   GENERAL: healthy, alert and no distress  EYES: Eyes grossly normal to inspection, PERRL and conjunctivae and sclerae normal  HENT: ear canals and TM's normal, nose and mouth without ulcers or lesions  NECK: no adenopathy, no asymmetry, masses, or scars and thyroid normal to palpation  RESP: lungs clear to auscultation - no rales, rhonchi or wheezes  CV: regular rate and rhythm, normal S1 S2, no S3 or S4, no murmur, click or rub, no peripheral edema and peripheral pulses strong  ABDOMEN: soft, nontender, no hepatosplenomegaly, no masses and bowel sounds normal  MS: trigger fingers 3 and 4 right hand   mcp swelling without synovitis  Pain along SI joint bilaterally   Pain along knees and left ankle on extension of joint into the lateral malleolus      Results for orders placed or performed in visit on 04/01/22   CBC with platelets and differential     Status: None (In process)    Narrative    The following orders were created for panel order CBC with platelets and  differential.  Procedure                               Abnormality         Status                     ---------                               -----------         ------                     CBC with platelets and d...[[id number]]                      In process                   Please view results for these tests on the individual orders.

## 2022-04-01 NOTE — LETTER
"April 7, 2022    Marianna Calderon  [address] [address] [place] [id number]          Dear ,    We are writing to inform you of your test results.    Diane, these labs look good and are suggestive of \"wear and tear \"  arthritis which is a better problem than inflammatory arthritis   Anti-inflammatories and therapy and exercise are the best treatments       Resulted Orders   Basic metabolic panel  (Ca, Cl, CO2, Creat, Gluc, K, Na, BUN)   Result Value Ref Range    Sodium 142 133 - 144 mmol/L    Potassium 3.4 3.4 - 5.3 mmol/L    Chloride 108 94 - 109 mmol/L    Carbon Dioxide (CO2) 26 20 - 32 mmol/L    Anion Gap 8 3 - 14 mmol/L    Urea Nitrogen 17 7 - 30 mg/dL    Creatinine 1.34 (H) 0.52 - 1.04 mg/dL    Calcium 9.2 8.5 - 10.1 mg/dL    Glucose 128 (H) 70 - 99 mg/dL    GFR Estimate 39 (L) >60 mL/min/1.73m2      Comment:      Effective December 21, 2021 eGFRcr in adults is calculated using the 2021 CKD-EPI creatinine equation which includes age and gender (Francisco Javier et al., NEJ, DOI: 10.1056/VVZIlh9125291)   Lyme Disease Ariana with reflex to WB Serum   Result Value Ref Range    Lyme Disease Antibodies Total 0.03 <0.90      Comment:      Negative, Absence of detectable Borrelia burdorferi antibodies. A negative result does not exclude the possibility of Borrelia burgdorferi infection. If early Lyme disease is suspected, a second sample should be collected and tested 2 to 4 weeks later.   Anti Nuclear Ariana IgG by IFA with Reflex   Result Value Ref Range    REJI interpretation Negative Negative      Comment:        Negative:              <1:40  Borderline Positive:   1:40 - 1:80  Positive:              >1:80   Rheumatoid factor   Result Value Ref Range    Rheumatoid Factor <6 <12 IU/mL   CRP, inflammation   Result Value Ref Range    CRP Inflammation 5.0 0.0 - 8.0 mg/L   ESR: Erythrocyte sedimentation rate   Result Value Ref Range    Erythrocyte Sedimentation Rate 12 0 - 30 mm/hr   CBC with platelets and differential "   Result Value Ref Range    WBC Count 10.5 4.0 - 11.0 10e3/uL    RBC Count 4.76 3.80 - 5.20 10e6/uL    Hemoglobin 13.1 11.7 - 15.7 g/dL    Hematocrit 41.3 35.0 - 47.0 %    MCV 87 78 - 100 fL    MCH 27.5 26.5 - 33.0 pg    MCHC 31.7 31.5 - 36.5 g/dL    RDW 14.7 10.0 - 15.0 %    Platelet Count 448 150 - 450 10e3/uL    % Neutrophils 56 %    % Lymphocytes 29 %    % Monocytes 8 %    % Eosinophils 6 %    % Basophils 1 %    Absolute Neutrophils 5.9 1.6 - 8.3 10e3/uL    Absolute Lymphocytes 3.1 0.8 - 5.3 10e3/uL    Absolute Monocytes 0.9 0.0 - 1.3 10e3/uL    Absolute Eosinophils 0.6 0.0 - 0.7 10e3/uL    Absolute Basophils 0.1 0.0 - 0.2 10e3/uL       If you have any questions or concerns, please call the clinic at the number listed above.       Sincerely,      Goran Lima MD

## 2022-04-04 LAB
B BURGDOR IGG+IGM SER QL: 0.03
RHEUMATOID FACT SER NEPH-ACNC: <6 IU/ML

## 2022-04-05 LAB — ANA SER QL IF: NEGATIVE

## 2022-05-14 DIAGNOSIS — I10 ESSENTIAL HYPERTENSION: ICD-10-CM

## 2022-05-14 NOTE — TELEPHONE ENCOUNTER
Routing refill request to provider for review/approval because:  Labs out of range:    Creatinine   Date Value Ref Range Status   04/01/2022 1.34 (H) 0.52 - 1.04 mg/dL Final   04/29/2021 0.93 0.52 - 1.04 mg/dL Final

## 2022-05-16 RX ORDER — LOSARTAN POTASSIUM AND HYDROCHLOROTHIAZIDE 12.5; 1 MG/1; MG/1
1 TABLET ORAL DAILY
Qty: 90 TABLET | Refills: 1 | Status: SHIPPED | OUTPATIENT
Start: 2022-05-16 | End: 2022-10-17

## 2022-06-22 ENCOUNTER — IMMUNIZATION (OUTPATIENT)
Dept: NURSING | Facility: CLINIC | Age: 82
End: 2022-06-22
Payer: COMMERCIAL

## 2022-06-22 PROCEDURE — 0064A COVID-19,PF,MODERNA (18+ YRS BOOSTER .25ML): CPT

## 2022-06-22 PROCEDURE — 91306 COVID-19,PF,MODERNA (18+ YRS BOOSTER .25ML): CPT

## 2022-07-10 DIAGNOSIS — M62.838 MUSCLE SPASM: ICD-10-CM

## 2022-07-11 RX ORDER — TIZANIDINE 2 MG/1
TABLET ORAL
Qty: 60 TABLET | Refills: 0 | Status: SHIPPED | OUTPATIENT
Start: 2022-07-11

## 2022-09-13 ENCOUNTER — OFFICE VISIT (OUTPATIENT)
Dept: FAMILY MEDICINE | Facility: CLINIC | Age: 82
End: 2022-09-13

## 2022-09-13 ENCOUNTER — ANCILLARY PROCEDURE (OUTPATIENT)
Dept: GENERAL RADIOLOGY | Facility: CLINIC | Age: 82
End: 2022-09-13
Attending: INTERNAL MEDICINE
Payer: COMMERCIAL

## 2022-09-13 VITALS
WEIGHT: 184.25 LBS | SYSTOLIC BLOOD PRESSURE: 138 MMHG | BODY MASS INDEX: 32.64 KG/M2 | HEART RATE: 82 BPM | DIASTOLIC BLOOD PRESSURE: 82 MMHG | TEMPERATURE: 97.5 F | OXYGEN SATURATION: 97 %

## 2022-09-13 DIAGNOSIS — M25.572 PAIN IN JOINT INVOLVING ANKLE AND FOOT, LEFT: ICD-10-CM

## 2022-09-13 DIAGNOSIS — S82.402A TIBIA/FIBULA FRACTURE, LEFT, CLOSED, INITIAL ENCOUNTER: ICD-10-CM

## 2022-09-13 DIAGNOSIS — M25.572 PAIN IN JOINT INVOLVING ANKLE AND FOOT, LEFT: Primary | ICD-10-CM

## 2022-09-13 DIAGNOSIS — S82.202A TIBIA/FIBULA FRACTURE, LEFT, CLOSED, INITIAL ENCOUNTER: ICD-10-CM

## 2022-09-13 DIAGNOSIS — L40.9 PSORIASIS: ICD-10-CM

## 2022-09-13 PROCEDURE — 99214 OFFICE O/P EST MOD 30 MIN: CPT | Performed by: INTERNAL MEDICINE

## 2022-09-13 PROCEDURE — 73610 X-RAY EXAM OF ANKLE: CPT | Mod: TC | Performed by: RADIOLOGY

## 2022-09-13 RX ORDER — BETAMETHASONE DIPROPIONATE 0.5 MG/G
LOTION TOPICAL 2 TIMES DAILY
Qty: 120 ML | Refills: 4 | Status: SHIPPED | OUTPATIENT
Start: 2022-09-13 | End: 2024-07-30

## 2022-09-13 RX ORDER — GABAPENTIN 100 MG/1
100-500 CAPSULE ORAL AT BEDTIME
Qty: 150 CAPSULE | Refills: 4 | Status: SHIPPED | OUTPATIENT
Start: 2022-09-13 | End: 2024-07-30

## 2022-09-13 ASSESSMENT — PAIN SCALES - GENERAL: PAINLEVEL: NO PAIN (0)

## 2022-09-13 NOTE — PROGRESS NOTES
Assessment & Plan   Problem List Items Addressed This Visit    None     Visit Diagnoses     Pain in joint involving ankle and foot, left    -  Primary    Relevant Orders    XR Ankle Left G/E 3 Views (Completed)    Psoriasis        Relevant Medications    betamethasone dipropionate (DIPROSONE) 0.05 % external lotion    Tibia/fibula fracture, left, closed, initial encounter        Relevant Medications    gabapentin (NEURONTIN) 100 MG capsule         Xray shows good healing and approximation           Work on weight loss  Regular exercise    No follow-ups on file.    Goran Lima MD  [facility] SOL Cabral is a 82 year old, presenting for the following health issues:  RECHECK      HPI     Follow up on bp and ankle    celebrex did not help  140 /70   Ankle   Since the day   No weight for 10 weeks after injury   Start walking without the boot  Initially was not   Walking steps with it           Review of Systems         Objective    BP (!) 152/84 (BP Location: Right arm, Patient Position: Chair, Cuff Size: Adult Regular)   Pulse 82   Temp 97.5  F (36.4  C) (Temporal)   Wt 83.6 kg (184 lb 4 oz)   SpO2 97%   Breastfeeding No   BMI 32.64 kg/m    Body mass index is 32.64 kg/m .  Physical Exam

## 2022-09-27 ENCOUNTER — ALLIED HEALTH/NURSE VISIT (OUTPATIENT)
Dept: FAMILY MEDICINE | Facility: CLINIC | Age: 82
End: 2022-09-27
Payer: COMMERCIAL

## 2022-09-27 DIAGNOSIS — Z23 ENCOUNTER FOR IMMUNIZATION: ICD-10-CM

## 2022-09-27 DIAGNOSIS — Z23 HIGH PRIORITY FOR 2019-NCOV VACCINE: Primary | ICD-10-CM

## 2022-09-27 PROCEDURE — 99207 PR NO CHARGE NURSE ONLY: CPT

## 2022-09-27 PROCEDURE — 91312 COVID-19,PF,PFIZER BOOSTER BIVALENT: CPT

## 2022-09-27 PROCEDURE — G0008 ADMIN INFLUENZA VIRUS VAC: HCPCS | Mod: 59

## 2022-09-27 PROCEDURE — 0124A COVID-19,PF,PFIZER BOOSTER BIVALENT: CPT

## 2022-09-27 PROCEDURE — 90662 IIV NO PRSV INCREASED AG IM: CPT

## 2022-09-27 NOTE — PROGRESS NOTES
Prior to immunization administration, verified patients identity using patient s name and date of birth. Please see Immunization Activity for additional information.     Screening Questionnaire for Adult Immunization    Are you sick today?   No   Do you have allergies to medications, food, a vaccine component or latex?   No   Have you ever had a serious reaction after receiving a vaccination?   No   Do you have a long-term health problem with heart, lung, kidney, or metabolic disease (e.g., diabetes), asthma, a blood disorder, no spleen, complement component deficiency, a cochlear implant, or a spinal fluid leak?  Are you on long-term aspirin therapy?   No   Do you have cancer, leukemia, HIV/AIDS, or any other immune system problem?   No   Do you have a parent, brother, or sister with an immune system problem?   No   In the past 3 months, have you taken medications that affect  your immune system, such as prednisone, other steroids, or anticancer drugs; drugs for the treatment of rheumatoid arthritis, Crohn s disease, or psoriasis; or have you had radiation treatments?   No   Have you had a seizure, or a brain or other nervous system problem?   No   During the past year, have you received a transfusion of blood or blood    products, or been given immune (gamma) globulin or antiviral drug?   No   For women: Are you pregnant or is there a chance you could become       pregnant during the next month?   No   Have you received any vaccinations in the past 4 weeks?   No     Immunization questionnaire answers were all negative.        Per orders of Susan Bruce, injections of HD Influenza and Bivalent COVID were given by Leora Macias. Patient instructed to remain in clinic for 15 minutes afterwards, and to report any adverse reaction to me immediately.       Screening performed by Leora Macias on 9/27/2022 at 10:59 AM.

## 2022-10-16 DIAGNOSIS — I10 ESSENTIAL HYPERTENSION: ICD-10-CM

## 2022-10-17 RX ORDER — LOSARTAN POTASSIUM AND HYDROCHLOROTHIAZIDE 12.5; 1 MG/1; MG/1
TABLET ORAL
Qty: 90 TABLET | Refills: 0 | Status: SHIPPED | OUTPATIENT
Start: 2022-10-17 | End: 2023-01-23

## 2023-01-21 DIAGNOSIS — I10 ESSENTIAL HYPERTENSION: ICD-10-CM

## 2023-01-23 RX ORDER — LOSARTAN POTASSIUM AND HYDROCHLOROTHIAZIDE 12.5; 1 MG/1; MG/1
TABLET ORAL
Qty: 90 TABLET | Refills: 0 | Status: SHIPPED | OUTPATIENT
Start: 2023-01-23 | End: 2023-04-11

## 2023-04-11 DIAGNOSIS — I10 ESSENTIAL HYPERTENSION: ICD-10-CM

## 2023-04-11 RX ORDER — LOSARTAN POTASSIUM AND HYDROCHLOROTHIAZIDE 12.5; 1 MG/1; MG/1
TABLET ORAL
Qty: 90 TABLET | Refills: 0 | Status: SHIPPED | OUTPATIENT
Start: 2023-04-11 | End: 2023-07-27

## 2023-07-27 DIAGNOSIS — I10 ESSENTIAL HYPERTENSION: ICD-10-CM

## 2023-07-27 RX ORDER — LOSARTAN POTASSIUM AND HYDROCHLOROTHIAZIDE 12.5; 1 MG/1; MG/1
1 TABLET ORAL DAILY
Qty: 90 TABLET | Refills: 3 | Status: SHIPPED | OUTPATIENT
Start: 2023-07-27 | End: 2024-07-30

## 2023-10-23 ENCOUNTER — IMMUNIZATION (OUTPATIENT)
Dept: NURSING | Facility: CLINIC | Age: 83
End: 2023-10-23
Payer: COMMERCIAL

## 2023-10-23 PROCEDURE — G0008 ADMIN INFLUENZA VIRUS VAC: HCPCS | Mod: 59

## 2023-10-23 PROCEDURE — 90480 ADMN SARSCOV2 VAC 1/ONLY CMP: CPT

## 2023-10-23 PROCEDURE — 91320 SARSCV2 VAC 30MCG TRS-SUC IM: CPT

## 2023-10-23 PROCEDURE — 90662 IIV NO PRSV INCREASED AG IM: CPT

## 2024-07-30 ENCOUNTER — OFFICE VISIT (OUTPATIENT)
Dept: FAMILY MEDICINE | Facility: CLINIC | Age: 84
End: 2024-07-30
Payer: COMMERCIAL

## 2024-07-30 VITALS
HEIGHT: 62 IN | HEART RATE: 81 BPM | WEIGHT: 188 LBS | RESPIRATION RATE: 18 BRPM | TEMPERATURE: 98.4 F | BODY MASS INDEX: 34.6 KG/M2 | DIASTOLIC BLOOD PRESSURE: 75 MMHG | SYSTOLIC BLOOD PRESSURE: 179 MMHG | OXYGEN SATURATION: 98 %

## 2024-07-30 DIAGNOSIS — R60.9 DEPENDENT EDEMA: ICD-10-CM

## 2024-07-30 DIAGNOSIS — I10 ESSENTIAL HYPERTENSION: ICD-10-CM

## 2024-07-30 DIAGNOSIS — R30.0 DYSURIA: ICD-10-CM

## 2024-07-30 DIAGNOSIS — I50.32 CHRONIC DIASTOLIC CONGESTIVE HEART FAILURE (H): ICD-10-CM

## 2024-07-30 DIAGNOSIS — E78.5 HYPERLIPIDEMIA LDL GOAL <130: Primary | ICD-10-CM

## 2024-07-30 DIAGNOSIS — Z23 NEED FOR TDAP VACCINATION: ICD-10-CM

## 2024-07-30 DIAGNOSIS — Z29.11 NEED FOR VACCINATION AGAINST RESPIRATORY SYNCYTIAL VIRUS: ICD-10-CM

## 2024-07-30 DIAGNOSIS — L40.9 PSORIASIS: ICD-10-CM

## 2024-07-30 LAB
ALBUMIN SERPL BCG-MCNC: 4.2 G/DL (ref 3.5–5.2)
ALBUMIN UR-MCNC: NEGATIVE MG/DL
ALP SERPL-CCNC: 116 U/L (ref 40–150)
ALT SERPL W P-5'-P-CCNC: 7 U/L (ref 0–50)
ANION GAP SERPL CALCULATED.3IONS-SCNC: 11 MMOL/L (ref 7–15)
APPEARANCE UR: CLEAR
AST SERPL W P-5'-P-CCNC: 14 U/L (ref 0–45)
BACTERIA #/AREA URNS HPF: ABNORMAL /HPF
BASOPHILS # BLD AUTO: 0.1 10E3/UL (ref 0–0.2)
BASOPHILS NFR BLD AUTO: 0 %
BILIRUB SERPL-MCNC: 0.3 MG/DL
BILIRUB UR QL STRIP: NEGATIVE
BUN SERPL-MCNC: 24.2 MG/DL (ref 8–23)
CALCIUM SERPL-MCNC: 9.6 MG/DL (ref 8.8–10.4)
CHLORIDE SERPL-SCNC: 106 MMOL/L (ref 98–107)
CHOLEST SERPL-MCNC: 240 MG/DL
COLOR UR AUTO: YELLOW
CREAT SERPL-MCNC: 1.54 MG/DL (ref 0.51–0.95)
EGFRCR SERPLBLD CKD-EPI 2021: 33 ML/MIN/1.73M2
EOSINOPHIL # BLD AUTO: 0.7 10E3/UL (ref 0–0.7)
EOSINOPHIL NFR BLD AUTO: 7 %
ERYTHROCYTE [DISTWIDTH] IN BLOOD BY AUTOMATED COUNT: 13 % (ref 10–15)
FASTING STATUS PATIENT QL REPORTED: YES
FASTING STATUS PATIENT QL REPORTED: YES
GLUCOSE SERPL-MCNC: 130 MG/DL (ref 70–99)
GLUCOSE UR STRIP-MCNC: NEGATIVE MG/DL
HCO3 SERPL-SCNC: 24 MMOL/L (ref 22–29)
HCT VFR BLD AUTO: 41.6 % (ref 35–47)
HDLC SERPL-MCNC: 37 MG/DL
HGB BLD-MCNC: 13 G/DL (ref 11.7–15.7)
HGB UR QL STRIP: NEGATIVE
IMM GRANULOCYTES # BLD: 0.2 10E3/UL
IMM GRANULOCYTES NFR BLD: 2 %
KETONES UR STRIP-MCNC: NEGATIVE MG/DL
LDLC SERPL CALC-MCNC: 164 MG/DL
LEUKOCYTE ESTERASE UR QL STRIP: ABNORMAL
LYMPHOCYTES # BLD AUTO: 2.6 10E3/UL (ref 0.8–5.3)
LYMPHOCYTES NFR BLD AUTO: 23 %
MCH RBC QN AUTO: 27.8 PG (ref 26.5–33)
MCHC RBC AUTO-ENTMCNC: 31.3 G/DL (ref 31.5–36.5)
MCV RBC AUTO: 89 FL (ref 78–100)
MONOCYTES # BLD AUTO: 0.8 10E3/UL (ref 0–1.3)
MONOCYTES NFR BLD AUTO: 7 %
NEUTROPHILS # BLD AUTO: 6.9 10E3/UL (ref 1.6–8.3)
NEUTROPHILS NFR BLD AUTO: 61 %
NITRATE UR QL: NEGATIVE
NONHDLC SERPL-MCNC: 203 MG/DL
NT-PROBNP SERPL-MCNC: 182 PG/ML (ref 0–1800)
PH UR STRIP: 5.5 [PH] (ref 5–7)
PLATELET # BLD AUTO: 383 10E3/UL (ref 150–450)
POTASSIUM SERPL-SCNC: 5.1 MMOL/L (ref 3.4–5.3)
PROT SERPL-MCNC: 7.2 G/DL (ref 6.4–8.3)
RBC # BLD AUTO: 4.68 10E6/UL (ref 3.8–5.2)
RBC #/AREA URNS AUTO: ABNORMAL /HPF
SODIUM SERPL-SCNC: 141 MMOL/L (ref 135–145)
SP GR UR STRIP: 1.02 (ref 1–1.03)
SQUAMOUS #/AREA URNS AUTO: ABNORMAL /LPF
TRIGL SERPL-MCNC: 195 MG/DL
TSH SERPL DL<=0.005 MIU/L-ACNC: 1.84 UIU/ML (ref 0.3–4.2)
UROBILINOGEN UR STRIP-ACNC: 0.2 E.U./DL
WBC # BLD AUTO: 11.3 10E3/UL (ref 4–11)
WBC #/AREA URNS AUTO: ABNORMAL /HPF

## 2024-07-30 PROCEDURE — 80061 LIPID PANEL: CPT | Performed by: INTERNAL MEDICINE

## 2024-07-30 PROCEDURE — 87086 URINE CULTURE/COLONY COUNT: CPT | Performed by: INTERNAL MEDICINE

## 2024-07-30 PROCEDURE — 83880 ASSAY OF NATRIURETIC PEPTIDE: CPT | Performed by: INTERNAL MEDICINE

## 2024-07-30 PROCEDURE — 81001 URINALYSIS AUTO W/SCOPE: CPT | Performed by: INTERNAL MEDICINE

## 2024-07-30 PROCEDURE — 99213 OFFICE O/P EST LOW 20 MIN: CPT | Mod: 25 | Performed by: INTERNAL MEDICINE

## 2024-07-30 PROCEDURE — 80053 COMPREHEN METABOLIC PANEL: CPT | Performed by: INTERNAL MEDICINE

## 2024-07-30 PROCEDURE — 99397 PER PM REEVAL EST PAT 65+ YR: CPT | Performed by: INTERNAL MEDICINE

## 2024-07-30 PROCEDURE — 36415 COLL VENOUS BLD VENIPUNCTURE: CPT | Performed by: INTERNAL MEDICINE

## 2024-07-30 PROCEDURE — 84443 ASSAY THYROID STIM HORMONE: CPT | Performed by: INTERNAL MEDICINE

## 2024-07-30 PROCEDURE — 85025 COMPLETE CBC W/AUTO DIFF WBC: CPT | Performed by: INTERNAL MEDICINE

## 2024-07-30 RX ORDER — LOSARTAN POTASSIUM AND HYDROCHLOROTHIAZIDE 12.5; 1 MG/1; MG/1
1 TABLET ORAL DAILY
Qty: 90 TABLET | Refills: 3 | Status: SHIPPED | OUTPATIENT
Start: 2024-07-30

## 2024-07-30 RX ORDER — FUROSEMIDE 20 MG
20 TABLET ORAL DAILY
Qty: 31 TABLET | Refills: 4 | Status: SHIPPED | OUTPATIENT
Start: 2024-07-30

## 2024-07-30 RX ORDER — BETAMETHASONE DIPROPIONATE 0.5 MG/G
LOTION TOPICAL 2 TIMES DAILY
Qty: 120 ML | Refills: 4 | Status: SHIPPED | OUTPATIENT
Start: 2024-07-30

## 2024-07-30 SDOH — HEALTH STABILITY: PHYSICAL HEALTH: ON AVERAGE, HOW MANY DAYS PER WEEK DO YOU ENGAGE IN MODERATE TO STRENUOUS EXERCISE (LIKE A BRISK WALK)?: 0 DAYS

## 2024-07-30 SDOH — HEALTH STABILITY: PHYSICAL HEALTH: ON AVERAGE, HOW MANY MINUTES DO YOU ENGAGE IN EXERCISE AT THIS LEVEL?: 0 MIN

## 2024-07-30 ASSESSMENT — SOCIAL DETERMINANTS OF HEALTH (SDOH): HOW OFTEN DO YOU GET TOGETHER WITH FRIENDS OR RELATIVES?: PATIENT DECLINED

## 2024-07-30 NOTE — PROGRESS NOTES
"Preventive Care Visit  [facility] SOL Lima MD, Internal Medicine - Pediatrics  Jul 30, 2024      Assessment & Plan   Problem List Items Addressed This Visit       Essential hypertension    Relevant Medications    losartan-hydrochlorothiazide (HYZAAR) 100-12.5 MG tablet    HYPERLIPIDEMIA LDL GOAL <130 - Primary    Relevant Orders    Lipid panel reflex to direct LDL Non-fasting (Completed)     Other Visit Diagnoses       Need for Tdap vaccination        Need for vaccination against respiratory syncytial virus        Dependent edema        Relevant Medications    furosemide (LASIX) 20 MG tablet    Other Relevant Orders    Comprehensive metabolic panel (BMP + Alb, Alk Phos, ALT, AST, Total. Bili, TP) (Completed)    CBC with platelets and differential (Completed)    BNP-N terminal pro (Completed)    TSH with free T4 reflex (Completed)    Chronic diastolic congestive heart failure (H)        Relevant Orders    BNP-N terminal pro (Completed)    Dysuria        Relevant Orders    UA Macroscopic with reflex to Microscopic and Culture - Lab Collect (Completed)    UA Microscopic with Reflex to Culture (Completed)    Urine Culture    Psoriasis        Relevant Medications    betamethasone dipropionate (DIPROSONE) 0.05 % external lotion                    BMI  Estimated body mass index is 34.39 kg/m  as calculated from the following:    Height as of this encounter: 1.575 m (5' 2\").    Weight as of this encounter: 85.3 kg (188 lb).   Weight management plan: Discussed healthy diet and exercise guidelines    Counseling  Appropriate preventive services were addressed with this patient via screening, questionnaire, or discussion as appropriate for fall prevention, nutrition, physical activity, Tobacco-use cessation, weight loss and cognition.  Checklist reviewing preventive services available has been given to the patient.  Reviewed patient's diet, addressing concerns and/or questions.   The patient was " instructed to see the dentist every 6 months.   The patient was provided with written information regarding signs of hearing loss.     Work on weight loss  Regular exercise      Rian Cabral is a 84 year old, presenting for the following:  Physical        7/30/2024    10:14 AM   Additional Questions   Roomed by Dulce   Accompanied by Daughter         Health Care Directive  Patient does not have a Health Care Directive or Living Will: Discussed advance care planning with patient; information given to patient to review.    HPI    Left and right edema.    2 months   Shortness of breath sits down   Since broke leg little at a time   Knee new stresses .  Painful limited .  Sits down then ok             7/30/2024   General Health   How would you rate your overall physical health? Good   Feel stress (tense, anxious, or unable to sleep) Not at all            7/30/2024   Nutrition   Diet: Regular (no restrictions)            7/30/2024   Exercise   Days per week of moderate/strenous exercise 0 days   Average minutes spent exercising at this level 0 min      (!) EXERCISE CONCERN      7/30/2024   Social Factors   Frequency of gathering with friends or relatives Patient declined   Worry food won't last until get money to buy more No   Food not last or not have enough money for food? No   Do you have housing? (Housing is defined as stable permanent housing and does not include staying ouside in a car, in a tent, in an abandoned building, in an overnight shelter, or couch-surfing.) No   Are you worried about losing your housing? No   Lack of transportation? No   Unable to get utilities (heat,electricity)? No   Want help with housing or utility concern? No      (!) HOUSING CONCERN PRESENT      7/30/2024   Fall Risk   Fallen 2 or more times in the past year? No   Trouble with walking or balance? No             7/30/2024   Activities of Daily Living- Home Safety   Needs help with the following daily activites None of the above    Safety concerns in the home None of the above            7/30/2024   Dental   Dentist two times every year? (!) NO            7/30/2024   Hearing Screening   Hearing concerns? (!) I FEEL THAT PEOPLE ARE MUMBLING OR NOT SPEAKING CLEARLY.            7/30/2024   Driving Risk Screening   Patient/family members have concerns about driving No            7/30/2024   General Alertness/Fatigue Screening   Have you been more tired than usual lately? No            7/30/2024   Urinary Incontinence Screening   Bothered by leaking urine in past 6 months No            7/30/2024   TB Screening   Were you born outside of the [place]? No            Today's PHQ-2 Score:       7/30/2024     9:47 AM   PHQ-2 ( 1999 [employer])   Q1: Little interest or pleasure in doing things 0   Q2: Feeling down, depressed or hopeless 0   PHQ-2 Score 0   Q1: Little interest or pleasure in doing things Not at all   Q2: Feeling down, depressed or hopeless Not at all   PHQ-2 Score 0           7/30/2024   Substance Use   Alcohol more than 3/day or more than 7/wk No   Do you have a current opioid prescription? No   How severe/bad is pain from 1 to 10? 5/10   Do you use any other substances recreationally? No        Social History     Tobacco Use    Smoking status: Former     Current packs/day: 0.50     Average packs/day: 0.5 packs/day for 55.0 years (27.5 ttl pk-yrs)     Types: Cigarettes     Passive exposure: Never    Smokeless tobacco: Never    Tobacco comments:     3-4 cigarettes daily   Vaping Use    Vaping status: Never Used   Substance Use Topics    Alcohol use: No    Drug use: No          Mammogram Screening - Mammography discussed and declined              Reviewed and updated as needed this visit by Provider                    Lab work is in process  Labs reviewed in EPIC  BP Readings from Last 3 Encounters:   07/30/24 (!) 179/75   09/13/22 138/82   04/01/22 127/72    Wt Readings from Last 3 Encounters:   07/30/24 85.3 kg (188 lb)   09/13/22 83.6 kg (184  lb 4 oz)   04/01/22 81.2 kg (179 lb)                  Patient Active Problem List   Diagnosis    Smoking    Hypercholesteremia    HYPERLIPIDEMIA LDL GOAL <130    Seborrheic keratosis    Essential hypertension     Past Surgical History:   Procedure Laterality Date    C/SECTION, CLASSICAL      HC BIOPSY OF BREAST, OPEN INCISIONAL      HYSTERECTOMY, VAGINAL      SURGICAL HISTORY OF -       bladder procedure       Social History     Tobacco Use    Smoking status: Former     Current packs/day: 0.50     Average packs/day: 0.5 packs/day for 55.0 years (27.5 ttl pk-yrs)     Types: Cigarettes     Passive exposure: Never    Smokeless tobacco: Never    Tobacco comments:     3-4 cigarettes daily   Substance Use Topics    Alcohol use: No     Family History   Problem Relation Age of Onset    Cancer Mother     Heart Disease Father     Hypertension Daughter     Genitourinary Problems Daughter          Current Outpatient Medications   Medication Sig Dispense Refill    betamethasone dipropionate (DIPROSONE) 0.05 % external lotion Apply topically 2 times daily 120 mL 4    furosemide (LASIX) 20 MG tablet Take 1 tablet (20 mg) by mouth daily 31 tablet 4    losartan-hydrochlorothiazide (HYZAAR) 100-12.5 MG tablet Take 1 tablet by mouth daily 90 tablet 3    tiZANidine (ZANAFLEX) 2 MG tablet TAKE 1 TABLET (2 MG) BY MOUTH THREE TIMES DAILY AS NEEDED FOR MUSCLE SPASMS 60 tablet 0     Allergies   Allergen Reactions    Lisinopril-Hydrochlorothiazide      Nausea,fatigue     Current providers sharing in care for this patient include:  Patient Care Team:  Goran Lima MD as PCP - General (Internal Medicine - Pediatrics)  Goran Lima MD as Assigned PCP    The following health maintenance items are reviewed in Epic and correct as of today:  Health Maintenance   Topic Date Due    DEXA  Never done    RSV VACCINE (Pregnancy & 60+) (1 - 1-dose 60+ series) Never done    MEDICARE ANNUAL WELLNESS VISIT  Never done    Pneumococcal Vaccine: 65+  "Years (2 of 2 - PCV) 11/03/2009    LIPID  04/29/2022    ANNUAL REVIEW OF HM ORDERS  04/01/2023    COVID-19 Vaccine (7 - 2023-24 season) 02/23/2024    DTAP/TDAP/TD IMMUNIZATION (2 - Td or Tdap) 04/17/2024    ADVANCE CARE PLANNING  07/05/2024    INFLUENZA VACCINE (1) 09/01/2024    FALL RISK ASSESSMENT  07/30/2025    PHQ-2 (once per calendar year)  Completed    ZOSTER IMMUNIZATION  Completed    IPV IMMUNIZATION  Aged Out    HPV IMMUNIZATION  Aged Out    MENINGITIS IMMUNIZATION  Aged Out    RSV MONOCLONAL ANTIBODY  Aged Out         Review of Systems  Constitutional, HEENT, cardiovascular, pulmonary, gi and gu systems are negative, except as otherwise noted.     Objective    Exam  BP (!) 179/75 (BP Location: Left arm, Patient Position: Chair, Cuff Size: Adult Large)   Pulse 81   Temp 98.4  F (36.9  C)   Resp 18   Ht 1.575 m (5' 2\")   Wt 85.3 kg (188 lb)   SpO2 98%   BMI 34.39 kg/m     Estimated body mass index is 34.39 kg/m  as calculated from the following:    Height as of this encounter: 1.575 m (5' 2\").    Weight as of this encounter: 85.3 kg (188 lb).    Physical Exam  GENERAL: alert and no distress  EYES: Eyes grossly normal to inspection, PERRL and conjunctivae and sclerae normal  HENT: ear canals and TM's normal, nose and mouth without ulcers or lesions  NECK: no adenopathy, no asymmetry, masses, or scars  RESP: lungs clear to auscultation - no rales, rhonchi or wheezes  CV: regular rate and rhythm, normal S1 S2, no S3 or S4, no murmur, click or rub, no peripheral edema  ABDOMEN: soft, nontender, no hepatosplenomegaly, no masses and bowel sounds normal  MS: no gross musculoskeletal defects noted, no edema  SKIN: no suspicious lesions or rashes  NEURO: Normal strength and tone, mentation intact and speech normal  BACK: no CVA tenderness, no paralumbar tenderness  PSYCH: mentation appears normal, affect normal/bright  LYMPH: no cervical, supraclavicular, axillary, or inguinal adenopathy        7/30/2024 "   Mini Cog   Clock Draw Score 2 Normal   3 Item Recall 3 objects recalled   Mini Cog Total Score 5                 Signed Electronically by: Goran Lima MD

## 2024-08-01 LAB — BACTERIA UR CULT: NORMAL

## 2025-04-04 ENCOUNTER — ANCILLARY PROCEDURE (OUTPATIENT)
Dept: GENERAL RADIOLOGY | Facility: CLINIC | Age: 85
End: 2025-04-04
Attending: INTERNAL MEDICINE
Payer: COMMERCIAL

## 2025-04-04 ENCOUNTER — OFFICE VISIT (OUTPATIENT)
Dept: FAMILY MEDICINE | Facility: CLINIC | Age: 85
End: 2025-04-04
Payer: COMMERCIAL

## 2025-04-04 VITALS
SYSTOLIC BLOOD PRESSURE: 110 MMHG | HEART RATE: 77 BPM | TEMPERATURE: 97.9 F | RESPIRATION RATE: 20 BRPM | BODY MASS INDEX: 33.29 KG/M2 | DIASTOLIC BLOOD PRESSURE: 80 MMHG | OXYGEN SATURATION: 97 % | WEIGHT: 182 LBS

## 2025-04-04 DIAGNOSIS — I10 ESSENTIAL HYPERTENSION: Primary | ICD-10-CM

## 2025-04-04 DIAGNOSIS — Z78.0 ASYMPTOMATIC POSTMENOPAUSAL STATUS: ICD-10-CM

## 2025-04-04 DIAGNOSIS — R60.0 PERIPHERAL EDEMA: ICD-10-CM

## 2025-04-04 DIAGNOSIS — Z23 NEED FOR TDAP VACCINATION: ICD-10-CM

## 2025-04-04 DIAGNOSIS — R30.0 DYSURIA: ICD-10-CM

## 2025-04-04 DIAGNOSIS — R06.09 DYSPNEA ON EXERTION: ICD-10-CM

## 2025-04-04 LAB
ALBUMIN UR-MCNC: NEGATIVE MG/DL
APPEARANCE UR: CLEAR
BASOPHILS # BLD AUTO: 0.1 10E3/UL (ref 0–0.2)
BASOPHILS NFR BLD AUTO: 1 %
BILIRUB UR QL STRIP: NEGATIVE
COLOR UR AUTO: YELLOW
EOSINOPHIL # BLD AUTO: 0.8 10E3/UL (ref 0–0.7)
EOSINOPHIL NFR BLD AUTO: 6 %
ERYTHROCYTE [DISTWIDTH] IN BLOOD BY AUTOMATED COUNT: 13.3 % (ref 10–15)
GLUCOSE UR STRIP-MCNC: NEGATIVE MG/DL
HCT VFR BLD AUTO: 42.2 % (ref 35–47)
HGB BLD-MCNC: 12.9 G/DL (ref 11.7–15.7)
HGB UR QL STRIP: NEGATIVE
IMM GRANULOCYTES # BLD: 0.2 10E3/UL
IMM GRANULOCYTES NFR BLD: 1 %
KETONES UR STRIP-MCNC: NEGATIVE MG/DL
LEUKOCYTE ESTERASE UR QL STRIP: ABNORMAL
LYMPHOCYTES # BLD AUTO: 2.4 10E3/UL (ref 0.8–5.3)
LYMPHOCYTES NFR BLD AUTO: 21 %
MCH RBC QN AUTO: 27.2 PG (ref 26.5–33)
MCHC RBC AUTO-ENTMCNC: 30.6 G/DL (ref 31.5–36.5)
MCV RBC AUTO: 89 FL (ref 78–100)
MONOCYTES # BLD AUTO: 1 10E3/UL (ref 0–1.3)
MONOCYTES NFR BLD AUTO: 9 %
NEUTROPHILS # BLD AUTO: 7.4 10E3/UL (ref 1.6–8.3)
NEUTROPHILS NFR BLD AUTO: 63 %
NITRATE UR QL: NEGATIVE
PH UR STRIP: 5.5 [PH] (ref 5–7)
PLATELET # BLD AUTO: 388 10E3/UL (ref 150–450)
RBC # BLD AUTO: 4.74 10E6/UL (ref 3.8–5.2)
RBC #/AREA URNS AUTO: ABNORMAL /HPF
SP GR UR STRIP: 1.01 (ref 1–1.03)
SQUAMOUS #/AREA URNS AUTO: ABNORMAL /LPF
UROBILINOGEN UR STRIP-ACNC: 0.2 E.U./DL
WBC # BLD AUTO: 11.8 10E3/UL (ref 4–11)
WBC #/AREA URNS AUTO: ABNORMAL /HPF

## 2025-04-04 PROCEDURE — 81001 URINALYSIS AUTO W/SCOPE: CPT | Performed by: INTERNAL MEDICINE

## 2025-04-04 PROCEDURE — 1126F AMNT PAIN NOTED NONE PRSNT: CPT | Performed by: INTERNAL MEDICINE

## 2025-04-04 PROCEDURE — 84443 ASSAY THYROID STIM HORMONE: CPT | Performed by: INTERNAL MEDICINE

## 2025-04-04 PROCEDURE — 85025 COMPLETE CBC W/AUTO DIFF WBC: CPT | Performed by: INTERNAL MEDICINE

## 2025-04-04 PROCEDURE — 99214 OFFICE O/P EST MOD 30 MIN: CPT | Performed by: INTERNAL MEDICINE

## 2025-04-04 PROCEDURE — 80053 COMPREHEN METABOLIC PANEL: CPT | Performed by: INTERNAL MEDICINE

## 2025-04-04 PROCEDURE — 36415 COLL VENOUS BLD VENIPUNCTURE: CPT | Performed by: INTERNAL MEDICINE

## 2025-04-04 PROCEDURE — 3079F DIAST BP 80-89 MM HG: CPT | Performed by: INTERNAL MEDICINE

## 2025-04-04 PROCEDURE — 87086 URINE CULTURE/COLONY COUNT: CPT | Performed by: INTERNAL MEDICINE

## 2025-04-04 PROCEDURE — 71046 X-RAY EXAM CHEST 2 VIEWS: CPT | Mod: TC | Performed by: RADIOLOGY

## 2025-04-04 PROCEDURE — 3074F SYST BP LT 130 MM HG: CPT | Performed by: INTERNAL MEDICINE

## 2025-04-04 ASSESSMENT — PAIN SCALES - GENERAL: PAINLEVEL_OUTOF10: NO PAIN (0)

## 2025-04-04 NOTE — PROGRESS NOTES
"  Assessment & Plan   Problem List Items Addressed This Visit       Essential hypertension - Primary     Other Visit Diagnoses       Need for Tdap vaccination        Asymptomatic postmenopausal status        Peripheral edema        Relevant Orders    Comprehensive metabolic panel (BMP + Alb, Alk Phos, ALT, AST, Total. Bili, TP) (Completed)    TSH with free T4 reflex (Completed)    CBC with platelets and differential (Completed)    XR Chest 2 Views (Completed)    Dyspnea on exertion        Relevant Orders    Comprehensive metabolic panel (BMP + Alb, Alk Phos, ALT, AST, Total. Bili, TP) (Completed)    TSH with free T4 reflex (Completed)    CBC with platelets and differential (Completed)    XR Chest 2 Views (Completed)    Dysuria        Relevant Orders    UA Macroscopic with reflex to Microscopic and Culture - Lab Collect (Completed)    UA Microscopic with Reflex to Culture (Completed)    Urine Culture (Completed)           Low salt   Elevation   Compression  May need titration of diuretic     Does not want echo - doesn't want to know if heart is failing          BMI  Estimated body mass index is 33.29 kg/m  as calculated from the following:    Height as of 7/30/24: 1.575 m (5' 2\").    Weight as of this encounter: 82.6 kg (182 lb).   Weight management plan: Discussed healthy diet and exercise guidelines    Work on weight loss  Regular exercise      Rian Cabral is a 84 year old, presenting for the following health issues:  Leg Swelling (Lower extremities )      4/4/2025    12:08 PM   Additional Questions   Roomed by Pavithra   Accompanied by Daughter Ashwini         4/4/2025    12:08 PM   Patient Reported Additional Medications   Patient reports taking the following new medications none     History of Present Illness       Hypertension: She presents for follow up of hypertension.  She does not check blood pressure  regularly outside of the clinic. Outpatient blood pressures have not been over 140/90. She does " Left a message to call office back  not follow a low salt diet.     Reason for visit:  Bp check plus edema plus poss uti plus poss diabetes    She eats 0-1 servings of fruits and vegetables daily.She consumes 2 sweetened beverage(s) daily.She exercises with enough effort to increase her heart rate 9 or less minutes per day.  She exercises with enough effort to increase her heart rate 3 or less days per week.   She is taking medications regularly.      Edema  More shortness of breath more in the past year    Gets wheeze  Allergies, clogged nose   Oxygen stable       Swelling legs poss UTI and diabetes check        Review of Systems  Constitutional, HEENT, cardiovascular, pulmonary, gi and gu systems are negative, except as otherwise noted.      Objective    /80 (BP Location: Left arm, Patient Position: Sitting, Cuff Size: Adult Regular)   Pulse 77   Temp 97.9  F (36.6  C) (Temporal)   Resp 20   Wt 82.6 kg (182 lb)   SpO2 97%   BMI 33.29 kg/m    Body mass index is 33.29 kg/m .  Physical Exam   GENERAL: alert and no distress  NECK: no adenopathy, no asymmetry, masses, or scars  RESP: lungs clear to auscultation - no rales, rhonchi or wheezes  CV: regular rate and rhythm, normal S1 S2, no S3 or S4, no murmur, click or rub, no peripheral edema  ABDOMEN: soft, nontender, no hepatosplenomegaly, no masses and bowel sounds normal  MS: no gross musculoskeletal defects noted, no edema  2 plus lymphedema    Results for orders placed or performed in visit on 04/04/25   XR Chest 2 Views     Status: None    Narrative    EXAM: XR CHEST 2 VIEWS  LOCATION: [facility]  DATE: 4/4/2025    INDICATION:  Peripheral edema, Dyspnea on exertion  COMPARISON: None.      Impression    IMPRESSION: Linear opacity within the left lower lung zone probably represents postinflammatory scarring. No pleural effusion or pneumothorax.   Results for orders placed or performed in visit on 04/04/25   Comprehensive metabolic panel (BMP + Alb, Alk Phos, ALT,  AST, Total. Bili, TP)     Status: Abnormal   Result Value Ref Range    Sodium 141 135 - 145 mmol/L    Potassium 4.2 3.4 - 5.3 mmol/L    Carbon Dioxide (CO2) 24 22 - 29 mmol/L    Anion Gap 15 7 - 15 mmol/L    Urea Nitrogen 51.0 (H) 8.0 - 23.0 mg/dL    Creatinine 2.25 (H) 0.51 - 0.95 mg/dL    GFR Estimate 21 (L) >60 mL/min/1.73m2    Calcium 9.6 8.8 - 10.4 mg/dL    Chloride 102 98 - 107 mmol/L    Glucose 177 (H) 70 - 99 mg/dL    Alkaline Phosphatase 148 40 - 150 U/L    AST 15 0 - 45 U/L    ALT 6 0 - 50 U/L    Protein Total 7.5 6.4 - 8.3 g/dL    Albumin 4.3 3.5 - 5.2 g/dL    Bilirubin Total 0.4 <=1.2 mg/dL   TSH with free T4 reflex     Status: Normal   Result Value Ref Range    TSH 1.85 0.30 - 4.20 uIU/mL   UA Macroscopic with reflex to Microscopic and Culture - Lab Collect     Status: Abnormal    Specimen: Urine, Clean Catch   Result Value Ref Range    Color Urine Yellow Colorless, Straw, Light Yellow, Yellow    Appearance Urine Clear Clear    Glucose Urine Negative Negative mg/dL    Bilirubin Urine Negative Negative    Ketones Urine Negative Negative mg/dL    Specific Gravity Urine 1.015 1.003 - 1.035    Blood Urine Negative Negative    pH Urine 5.5 5.0 - 7.0    Protein Albumin Urine Negative Negative mg/dL    Urobilinogen Urine 0.2 0.2, 1.0 E.U./dL    Nitrite Urine Negative Negative    Leukocyte Esterase Urine Small (A) Negative   CBC with platelets and differential     Status: Abnormal   Result Value Ref Range    WBC Count 11.8 (H) 4.0 - 11.0 10e3/uL    RBC Count 4.74 3.80 - 5.20 10e6/uL    Hemoglobin 12.9 11.7 - 15.7 g/dL    Hematocrit 42.2 35.0 - 47.0 %    MCV 89 78 - 100 fL    MCH 27.2 26.5 - 33.0 pg    MCHC 30.6 (L) 31.5 - 36.5 g/dL    RDW 13.3 10.0 - 15.0 %    Platelet Count 388 150 - 450 10e3/uL    % Neutrophils 63 %    % Lymphocytes 21 %    % Monocytes 9 %    % Eosinophils 6 %    % Basophils 1 %    % Immature Granulocytes 1 %    Absolute Neutrophils 7.4 1.6 - 8.3 10e3/uL    Absolute Lymphocytes 2.4 0.8 - 5.3  10e3/uL    Absolute Monocytes 1.0 0.0 - 1.3 10e3/uL    Absolute Eosinophils 0.8 (H) 0.0 - 0.7 10e3/uL    Absolute Basophils 0.1 0.0 - 0.2 10e3/uL    Absolute Immature Granulocytes 0.2 <=0.4 10e3/uL   UA Microscopic with Reflex to Culture     Status: Abnormal   Result Value Ref Range    RBC Urine 0-2 0-2 /HPF /HPF    WBC Urine 10-25 (A) 0-5 /HPF /HPF    Squamous Epithelials Urine Few (A) None Seen /LPF   Urine Culture     Status: None    Specimen: Urine, Clean Catch   Result Value Ref Range    Culture <10,000 CFU/mL Mixture of Urogenital Florencia    CBC with platelets and differential     Status: Abnormal    Narrative    The following orders were created for panel order CBC with platelets and differential.  Procedure                               Abnormality         Status                     ---------                               -----------         ------                     CBC with platelets and ...[[id number]]  Abnormal            Final result                 Please view results for these tests on the individual orders.           Signed Electronically by: Goran Lima MD

## 2025-04-04 NOTE — LETTER
April 7, 2025      Marianna CHENCHO Kvng  [address] [place] [id number]        Dear ,    We are writing to inform you of your test results.  This is reassuring and shows no signs of lung or heart disease   Will treat with increased water pill dose if you would like   Resulted Orders   XR Chest 2 Views    Narrative    EXAM: XR CHEST 2 VIEWS  LOCATION: [facility]  DATE: 4/4/2025    INDICATION:  Peripheral edema, Dyspnea on exertion  COMPARISON: None.      Impression    IMPRESSION: Linear opacity within the left lower lung zone probably represents postinflammatory scarring. No pleural effusion or pneumothorax.   If you have any questions or concerns, please call the clinic at the number listed above.       Sincerely,      Goran Lima MD    Electronically signed

## 2025-04-05 LAB
ALBUMIN SERPL BCG-MCNC: 4.3 G/DL (ref 3.5–5.2)
ALP SERPL-CCNC: 148 U/L (ref 40–150)
ALT SERPL W P-5'-P-CCNC: 6 U/L (ref 0–50)
ANION GAP SERPL CALCULATED.3IONS-SCNC: 15 MMOL/L (ref 7–15)
AST SERPL W P-5'-P-CCNC: 15 U/L (ref 0–45)
BILIRUB SERPL-MCNC: 0.4 MG/DL
BUN SERPL-MCNC: 51 MG/DL (ref 8–23)
CALCIUM SERPL-MCNC: 9.6 MG/DL (ref 8.8–10.4)
CHLORIDE SERPL-SCNC: 102 MMOL/L (ref 98–107)
CREAT SERPL-MCNC: 2.25 MG/DL (ref 0.51–0.95)
EGFRCR SERPLBLD CKD-EPI 2021: 21 ML/MIN/1.73M2
GLUCOSE SERPL-MCNC: 177 MG/DL (ref 70–99)
HCO3 SERPL-SCNC: 24 MMOL/L (ref 22–29)
POTASSIUM SERPL-SCNC: 4.2 MMOL/L (ref 3.4–5.3)
PROT SERPL-MCNC: 7.5 G/DL (ref 6.4–8.3)
SODIUM SERPL-SCNC: 141 MMOL/L (ref 135–145)
TSH SERPL DL<=0.005 MIU/L-ACNC: 1.85 UIU/ML (ref 0.3–4.2)

## 2025-04-06 LAB — BACTERIA UR CULT: NORMAL

## 2025-04-07 ENCOUNTER — TELEPHONE (OUTPATIENT)
Dept: FAMILY MEDICINE | Facility: CLINIC | Age: 85
End: 2025-04-07
Payer: COMMERCIAL

## 2025-04-07 NOTE — TELEPHONE ENCOUNTER
----- Message from Goran Lima sent at 4/7/2025 12:09 PM CDT -----  Labs show change in the kidney function with the water pill - we should not increase the diuretic at this time

## 2025-04-07 NOTE — TELEPHONE ENCOUNTER
LVM for pt informing her that  does not want her to increase her diuretic. Pavithra Singleton MA

## 2025-04-09 NOTE — TELEPHONE ENCOUNTER
Received call from Ashwini, patient's daughter. Consent to communicate on file. She is requesting lab results. Notified of provider's message as written and understanding was verbalized.     ZANDRA Henderson RN  [facility], [facility]

## 2025-08-22 DIAGNOSIS — I10 ESSENTIAL HYPERTENSION: ICD-10-CM

## 2025-08-25 RX ORDER — LOSARTAN POTASSIUM AND HYDROCHLOROTHIAZIDE 12.5; 1 MG/1; MG/1
1 TABLET ORAL DAILY
Qty: 90 TABLET | Refills: 0 | Status: SHIPPED | OUTPATIENT
Start: 2025-08-25